# Patient Record
Sex: MALE | Race: BLACK OR AFRICAN AMERICAN | NOT HISPANIC OR LATINO | Employment: FULL TIME | URBAN - METROPOLITAN AREA
[De-identification: names, ages, dates, MRNs, and addresses within clinical notes are randomized per-mention and may not be internally consistent; named-entity substitution may affect disease eponyms.]

---

## 2018-06-28 ENCOUNTER — HOSPITAL ENCOUNTER (EMERGENCY)
Facility: HOSPITAL | Age: 33
Discharge: HOME | End: 2018-06-28
Attending: EMERGENCY MEDICINE
Payer: COMMERCIAL

## 2018-06-28 VITALS
WEIGHT: 220 LBS | TEMPERATURE: 99 F | OXYGEN SATURATION: 98 % | BODY MASS INDEX: 30.8 KG/M2 | HEART RATE: 110 BPM | SYSTOLIC BLOOD PRESSURE: 158 MMHG | DIASTOLIC BLOOD PRESSURE: 91 MMHG | HEIGHT: 71 IN | RESPIRATION RATE: 16 BRPM

## 2018-06-28 DIAGNOSIS — K08.89 PAIN, DENTAL: Primary | ICD-10-CM

## 2018-06-28 DIAGNOSIS — R68.84 PAIN IN UPPER JAW: ICD-10-CM

## 2018-06-28 PROCEDURE — 99283 EMERGENCY DEPT VISIT LOW MDM: CPT | Mod: ,,, | Performed by: EMERGENCY MEDICINE

## 2018-06-28 PROCEDURE — 99283 EMERGENCY DEPT VISIT LOW MDM: CPT

## 2018-06-28 RX ORDER — ACETAMINOPHEN AND CODEINE PHOSPHATE 300; 30 MG/1; MG/1
1 TABLET ORAL EVERY 6 HOURS PRN
Qty: 12 TABLET | Refills: 0 | Status: SHIPPED | OUTPATIENT
Start: 2018-06-28 | End: 2018-07-08

## 2018-06-28 RX ORDER — PENICILLIN V POTASSIUM 500 MG/1
500 TABLET, FILM COATED ORAL 4 TIMES DAILY
Qty: 40 TABLET | Refills: 0 | Status: SHIPPED | OUTPATIENT
Start: 2018-06-28 | End: 2018-07-05

## 2018-06-28 NOTE — ED PROVIDER NOTES
Encounter Date: 6/28/2018       History     Chief Complaint   Patient presents with    Dental Pain     3 days of progressive pain to right upper jaw. Had been scheduled to have a wisdome tooth extraction on July 7th. No fever, no chills, no difficulty swallowing or opening mouth.           Review of patient's allergies indicates:  No Known Allergies  History reviewed. No pertinent past medical history.  History reviewed. No pertinent surgical history.  History reviewed. No pertinent family history.  Social History   Substance Use Topics    Smoking status: Current Every Day Smoker    Smokeless tobacco: Never Used    Alcohol use Yes     Review of Systems   Constitutional: Negative.    HENT: Positive for dental problem and ear pain. Negative for sinus pain and sore throat.    Eyes: Negative.    Respiratory: Negative.    Cardiovascular: Negative.    Gastrointestinal: Negative.    Endocrine: Negative.    Genitourinary: Negative.    Musculoskeletal: Negative.    Neurological: Negative.    All other systems reviewed and are negative.      Physical Exam     Initial Vitals [06/28/18 1543]   BP Pulse Resp Temp SpO2   (!) 158/91 110 16 98.9 °F (37.2 °C) 98 %      MAP       --         Physical Exam    Nursing note and vitals reviewed.  Constitutional: He appears well-developed and well-nourished.   HENT:   Head: Normocephalic and atraumatic.   Mouth/Throat: Oropharynx is clear and moist.   Right upper wisdom tooth with fracture - missing outer posterior 1/4 of tooth.    Eyes: EOM are normal. Pupils are equal, round, and reactive to light.   Neck: Normal range of motion. No tracheal deviation present.   Cardiovascular: Normal rate, regular rhythm and normal heart sounds.   Pulmonary/Chest: Breath sounds normal.   Abdominal: Soft.   Musculoskeletal: Normal range of motion.   Lymphadenopathy:     He has no cervical adenopathy.   Neurological: He is alert and oriented to person, place, and time. He has normal strength.   Skin:  Skin is warm. Capillary refill takes less than 2 seconds.         ED Course   Procedures  Labs Reviewed - No data to display       Imaging Results    None          Medical Decision Making:   ED Management:  Counseled on tobacco use.                       Clinical Impression:   The primary encounter diagnosis was Pain, dental. A diagnosis of Pain in upper jaw was also pertinent to this visit.                             Miah Juares MD  06/28/18 [id number]

## 2018-11-05 ENCOUNTER — HOSPITAL ENCOUNTER (EMERGENCY)
Facility: HOSPITAL | Age: 33
Discharge: HOME | End: 2018-11-05
Attending: EMERGENCY MEDICINE
Payer: COMMERCIAL

## 2018-11-05 VITALS
WEIGHT: 200 LBS | RESPIRATION RATE: 18 BRPM | TEMPERATURE: 98 F | SYSTOLIC BLOOD PRESSURE: 138 MMHG | BODY MASS INDEX: 28 KG/M2 | HEART RATE: 85 BPM | OXYGEN SATURATION: 98 % | DIASTOLIC BLOOD PRESSURE: 78 MMHG | HEIGHT: 71 IN

## 2018-11-05 DIAGNOSIS — L84 PLANTAR CALLUS: Primary | ICD-10-CM

## 2018-11-05 PROCEDURE — 99283 EMERGENCY DEPT VISIT LOW MDM: CPT

## 2018-11-05 PROCEDURE — 99282 EMERGENCY DEPT VISIT SF MDM: CPT | Mod: ,,, | Performed by: PHYSICIAN ASSISTANT

## 2018-11-05 NOTE — ED NOTES
Patient with callous to left foot, which started hurting him last night. Denies DM.  Denies drainage or fevers.

## 2018-11-06 NOTE — ED PROVIDER NOTES
Encounter Date: 11/5/2018       History     Chief Complaint   Patient presents with    Foot Problem     32 y/o male presents to the ER with chief complaint of left foot pain since last night.  Patient reports 10/10 pain on the bottom of his foot this morning, but his pain has improved throughout the day.  He denies recent injury or fall.  His pain is worse with walking.  He denies redness of the skin, fever, chills, or additional complaints at this time.            Review of patient's allergies indicates:  No Known Allergies  History reviewed. No pertinent past medical history.  No past surgical history on file.  No family history on file.  Social History     Tobacco Use    Smoking status: Current Every Day Smoker     Packs/day: 1.50     Types: Cigarettes    Smokeless tobacco: Never Used   Substance Use Topics    Alcohol use: Yes    Drug use: Not on file     Review of Systems   Constitutional: Negative for chills and fever.   HENT: Negative for sore throat.    Respiratory: Negative for shortness of breath.    Cardiovascular: Negative for chest pain.   Gastrointestinal: Negative for nausea and vomiting.   Musculoskeletal: Negative for back pain.   Skin: Negative for color change and rash.       Physical Exam     Initial Vitals [11/05/18 1602]   BP Pulse Resp Temp SpO2   138/78 85 18 98.2 °F (36.8 °C) 98 %      MAP       --         Physical Exam    Nursing note and vitals reviewed.  Constitutional: He appears well-developed and well-nourished.   HENT:   Head: Atraumatic.   Eyes: Conjunctivae and EOM are normal. Pupils are equal, round, and reactive to light.   Neck: Normal range of motion. Neck supple.   Cardiovascular: Normal rate, regular rhythm and intact distal pulses.   Pulmonary/Chest: Breath sounds normal. No respiratory distress. He has no wheezes. He has no rhonchi. He has no rales.   Musculoskeletal:        Feet:    Neurological: He is alert and oriented to person, place, and time. He has normal  strength.   Skin: No rash noted.   Psychiatric: He has a normal mood and affect.         ED Course   Procedures  Labs Reviewed - No data to display       Imaging Results    None                APC / Resident Notes:   Patient presents to the ER for evaluation of left foot pain.  He has no history of trauma or bony tenderness on exam.  The patient has a plantar wart vs callus on the left foot.  I will advise treatment with over-the-counter salicylic acid.  He is also provided with podiatry clinic contact number if he does not improve with over-the-counter treatment.  Patient is advised to take Motrin and Tylenol for any discomfort.  He is given ER return precautions.  He is advised to follow up with a PCP within 1 week for ER follow-up exam.                 Clinical Impression:   The encounter diagnosis was Plantar callus.                             SISSY Rivera  11/05/18 2028

## 2018-11-20 ENCOUNTER — OFFICE VISIT (OUTPATIENT)
Dept: PODIATRY | Facility: CLINIC | Age: 33
End: 2018-11-20
Payer: COMMERCIAL

## 2018-11-20 VITALS
WEIGHT: 200 LBS | HEART RATE: 83 BPM | SYSTOLIC BLOOD PRESSURE: 146 MMHG | HEIGHT: 61 IN | BODY MASS INDEX: 37.76 KG/M2 | DIASTOLIC BLOOD PRESSURE: 81 MMHG

## 2018-11-20 DIAGNOSIS — L84 CORN OR CALLUS: Primary | ICD-10-CM

## 2018-11-20 DIAGNOSIS — M79.672 LEFT FOOT PAIN: ICD-10-CM

## 2018-11-20 PROCEDURE — 99203 OFFICE O/P NEW LOW 30 MIN: CPT | Mod: S$GLB,,, | Performed by: PODIATRIST

## 2018-11-20 PROCEDURE — 3008F BODY MASS INDEX DOCD: CPT | Mod: CPTII,S$GLB,, | Performed by: PODIATRIST

## 2018-11-20 PROCEDURE — 99999 PR PBB SHADOW E&M-EST. PATIENT-LVL III: CPT | Mod: PBBFAC,,, | Performed by: PODIATRIST

## 2018-11-27 NOTE — PROGRESS NOTES
Subjective:      Patient ID: Luca Ferraro is a 33 y.o. male.    Chief Complaint: Foot Pain (under the bottom)    Pt presents today c/o a painful callus under the bottom of his left foot. Pt states it is especially painful when he wear shoes and walks.     Review of Systems   Constitution: Negative for chills, fever and malaise/fatigue.   HENT: Negative for hearing loss.    Cardiovascular: Negative for claudication.   Respiratory: Negative for shortness of breath.    Skin: Positive for dry skin. Negative for flushing and rash.   Musculoskeletal: Negative for joint pain and myalgias.   Neurological: Negative for loss of balance, numbness, paresthesias and sensory change.   Psychiatric/Behavioral: Negative for altered mental status.           Objective:      Physical Exam   Constitutional: He is oriented to person, place, and time. He appears well-developed and well-nourished.   Cardiovascular:   Pulses:       Dorsalis pedis pulses are 2+ on the right side, and 2+ on the left side.        Posterior tibial pulses are 2+ on the right side, and 2+ on the left side.   no edema noted to b/L LEs   Musculoskeletal:        Right knee: He exhibits no swelling and no ecchymosis.        Left knee: He exhibits no swelling and no ecchymosis.        Right ankle: He exhibits normal range of motion, no swelling, no ecchymosis and normal pulse. No lateral malleolus, no medial malleolus and no head of 5th metatarsal tenderness found. Achilles tendon exhibits no pain, no defect and normal Barcenas's test results.        Left ankle: He exhibits normal range of motion, no swelling, no ecchymosis and normal pulse. No lateral malleolus, no medial malleolus and no head of 5th metatarsal tenderness found. Achilles tendon exhibits no pain and normal Barcenas's test results.        Right lower leg: He exhibits no tenderness, no bony tenderness, no swelling, no edema and no deformity.        Left lower leg: He exhibits no tenderness, no  swelling and no edema.        Right foot: There is normal range of motion and no deformity.        Left foot: There is normal range of motion and no deformity.   Adequate joint ROM noted to all lower extremity muscle groups with no pain or crepitation noted. Muscle strength is 5/5 in all groups bilaterally.     Feet:   Right Foot:   Protective Sensation: 5 sites tested. 5 sites sensed.   Left Foot:   Protective Sensation: 5 sites tested. 5 sites sensed.   Neurological: He is alert and oriented to person, place, and time.   Gross sensation intact to b/L lower extremities   Skin: Skin is warm. Capillary refill takes more than 3 seconds. No abrasion, no bruising, no burn and no ecchymosis noted.   No open lesions noted to b/L lower extremities.   HKL's noted to sub 5th mets b/L   Psychiatric: He has a normal mood and affect. His speech is normal and behavior is normal. He is attentive.             Assessment:       Encounter Diagnoses   Name Primary?    Corn or callus Yes    Left foot pain          Plan:       Luca was seen today for foot pain.    Diagnoses and all orders for this visit:    Corn or callus    Left foot pain      I counseled the patient on his conditions, their implications and medical management.      Pt advised that he does not qualify for foot care. Pt advised on proper nail care, using a pumice stone for callused areas, moisturizing the feet, and overall foot care.   Pt advised that he can RTC as a Proc-b pt for foot care. Calluses debrided as a courtesy.   Call or return to clinic prn if these symptoms worsen or fail to improve as anticipated.      .

## 2018-12-26 ENCOUNTER — HOSPITAL ENCOUNTER (EMERGENCY)
Facility: HOSPITAL | Age: 33
Discharge: HOME | End: 2018-12-26
Attending: EMERGENCY MEDICINE
Payer: COMMERCIAL

## 2018-12-26 VITALS
RESPIRATION RATE: 16 BRPM | DIASTOLIC BLOOD PRESSURE: 91 MMHG | HEART RATE: 80 BPM | WEIGHT: 220 LBS | HEIGHT: 71 IN | SYSTOLIC BLOOD PRESSURE: 150 MMHG | BODY MASS INDEX: 30.8 KG/M2 | TEMPERATURE: 99 F | OXYGEN SATURATION: 99 %

## 2018-12-26 DIAGNOSIS — Z71.1 FEARED COMPLAINT WITHOUT DIAGNOSIS: Primary | ICD-10-CM

## 2018-12-26 PROCEDURE — 99282 EMERGENCY DEPT VISIT SF MDM: CPT | Mod: ,,, | Performed by: NURSE PRACTITIONER

## 2018-12-26 PROCEDURE — 99281 EMR DPT VST MAYX REQ PHY/QHP: CPT

## 2018-12-26 PROCEDURE — 99282 PR EMERGENCY DEPT VISIT,LEVEL II: ICD-10-PCS | Mod: ,,, | Performed by: NURSE PRACTITIONER

## 2018-12-26 NOTE — ED PROVIDER NOTES
Encounter Date: 12/26/2018       History     Chief Complaint   Patient presents with    Letter for School/Work     Headache last night.      Pt is a 34 yo male with no signifigant medical history presenting to the ED for a work note.  Pt states he left work last night due to a headache.  Pt states boss is requiring a note to go back to work today.  Pt denies any complaints on exam.  Pt states headache was resolved by sleeping.  Pt denies any dizziness, headache or blurred vision.  Pt denies any falls or trauma.          Review of patient's allergies indicates:  No Known Allergies  No past medical history on file.  No past surgical history on file.  No family history on file.  Social History     Tobacco Use    Smoking status: Current Every Day Smoker     Packs/day: 1.50     Types: Cigarettes    Smokeless tobacco: Never Used   Substance Use Topics    Alcohol use: Yes    Drug use: Not on file     Review of Systems   Constitutional: Negative for activity change, appetite change, chills, fatigue and fever.   HENT: Negative for sore throat.    Respiratory: Negative for cough, chest tightness, shortness of breath and wheezing.    Cardiovascular: Negative for chest pain and palpitations.   Gastrointestinal: Negative for abdominal pain, constipation, diarrhea, nausea and vomiting.   Genitourinary: Negative for decreased urine volume, difficulty urinating, dysuria and urgency.   Musculoskeletal: Negative for arthralgias, back pain, myalgias and neck pain.   Skin: Negative for rash and wound.   Neurological: Negative for dizziness, syncope, weakness, numbness and headaches.   All other systems reviewed and are negative.      Physical Exam     Initial Vitals [12/26/18 1411]   BP Pulse Resp Temp SpO2   (!) 150/91 80 16 98.8 °F (37.1 °C) 99 %      MAP       --         Physical Exam    Nursing note and vitals reviewed.  Constitutional: Vital signs are normal. He appears well-developed and well-nourished.   HENT:   Head:  Normocephalic and atraumatic.   Cardiovascular: Normal rate and regular rhythm.   Pulmonary/Chest: Effort normal.   Abdominal: Normal appearance.   Musculoskeletal: Normal range of motion.   Neurological: He is alert and oriented to person, place, and time. He has normal strength. No sensory deficit. GCS eye subscore is 4. GCS verbal subscore is 5. GCS motor subscore is 6.   Skin: Skin is warm, dry and intact. Capillary refill takes less than 2 seconds. No abrasion, no laceration and no rash noted. No cyanosis. Nails show no clubbing.         ED Course   Procedures  Labs Reviewed - No data to display       Imaging Results    None                APC / Resident Notes:   Emergent evaluation of a 32 yo male patient presenting to the ER due to needing a worknote.  Pt states that he left work early last night due to headache. Patient states he needs a note stating he is able to go back to work today.  On exam patient denies any complaints. Pupils equal round reactive 3-2 mm.  Abdomen soft and nontender. Breath sounds clear bilaterally. No tachycardia noted. Strength 5/5 in all extremities.  Patient able to ambulate with no assistance.  I do not feel labs or imaging are pertinent for the care of this patient.  I discussed the care of this patient with my Supervising Physician.      Patient is hemodynamically stable, vital signs are normal. Discharge instructions given. Return to ED precautions discussed. Follow up as directed.  Work note given to return to work today.  Pt verbalized understanding of this plan. Pt is stable for discharge.                    Clinical Impression:   The encounter diagnosis was Feared complaint without diagnosis.      Disposition:   Disposition: Discharged  Condition: Stable                        Geri Barrios NP  12/26/18 2028

## 2018-12-26 NOTE — ED NOTES
Patient identifiers verified and correct for Luca Ronan.    LOC: The patient is awake, alert and aware of environment with an appropriate affect, the patient is oriented x 3 and speaking appropriately.  APPEARANCE: Patient resting comfortably and in no acute distress, patient is clean and well groomed, patient's clothing is properly fastened.  SKIN: The skin is warm and dry, color consistent with ethnicity, patient has normal skin turgor and moist mucus membranes, skin intact, no breakdown or bruising noted.  MUSCULOSKELETAL: Patient moving all extremities spontaneously, no obvious swelling or deformities noted.  RESPIRATORY: Airway is open and patent, respirations are spontaneous, patient has a normal effort and rate, no accessory muscle use noted, bilateral breath sounds clear and present.   CARDIAC: Patient has a normal rate and regular rhythm, no periphreal edema noted, capillary refill < 3 seconds.  ABDOMEN: Soft and non tender to palpation, no distention noted, normoactive bowel sounds present in all four quadrants.  NEUROLOGIC: PERRL, 3 mm bilaterally, eyes open spontaneously, behavior appropriate to situation, follows commands, facial expression symmetrical, bilateral hand grasp equal and even, purposeful motor response noted, normal sensation in all extremities when touched with a finger.

## 2020-02-18 NOTE — ED NOTES
Addended by: LAWRENCE WATTS on: 2/18/2020 09:44 AM     Modules accepted: Orders     Pt identifiers checked and accurate with Luca Ferraro     Pt reports to ED with complaints of dental pain x 3 days. Pt reports dental appointment made, pain too sever to wait. Pt denies trauma, N/V/D, fever and chills.     LOC: The patient is awake, alert and aware of environment with an appropriate affect, the patient is oriented x 3 and speaking appropriately.  APPEARANCE: Patient resting comfortably and in no acute distress, patient is clean and well groomed  SKIN: The skin is warm and dry, color consistent with ethnicity, patient has normal skin turgor and moist mucus membranes, skin intact, no breakdown or bruising noted.  MUSCULOSKELETAL: Patient moving all extremities well, no obvious swelling or deformities noted. Pt ambulates unassisted, steady gait.   RESPIRATORY: Airway is open and patent; respirations are spontaneous, patient has a normal effort and rate, no accessory muscle use noted.   NEUROLOGIC: PERRL, 3 mm bilaterally, eyes open spontaneously, behavior appropriate to situation, follows commands, facial expression symmetrical. Pt denies headache, vision changes

## 2020-11-16 ENCOUNTER — HOSPITAL ENCOUNTER (INPATIENT)
Facility: HOSPITAL | Age: 35
LOS: 6 days | Discharge: HOME | DRG: 854 | End: 2020-11-22
Attending: EMERGENCY MEDICINE | Admitting: HOSPITALIST
Payer: MEDICAID

## 2020-11-16 DIAGNOSIS — L03.116 CELLULITIS AND ABSCESS OF LEFT LEG: Primary | ICD-10-CM

## 2020-11-16 DIAGNOSIS — Z72.0 TOBACCO ABUSE: ICD-10-CM

## 2020-11-16 DIAGNOSIS — M79.662 PAIN IN LEFT LOWER LEG: ICD-10-CM

## 2020-11-16 DIAGNOSIS — R07.9 CHEST PAIN: ICD-10-CM

## 2020-11-16 DIAGNOSIS — A41.9 SEPSIS, DUE TO UNSPECIFIED ORGANISM, UNSPECIFIED WHETHER ACUTE ORGAN DYSFUNCTION PRESENT: ICD-10-CM

## 2020-11-16 DIAGNOSIS — L03.116 CELLULITIS OF LEFT LOWER EXTREMITY: ICD-10-CM

## 2020-11-16 DIAGNOSIS — L02.416 CELLULITIS AND ABSCESS OF LEFT LEG: Primary | ICD-10-CM

## 2020-11-16 DIAGNOSIS — I47.20 V-TACH: ICD-10-CM

## 2020-11-16 LAB
ALBUMIN SERPL BCP-MCNC: 3.9 G/DL (ref 3.5–5.2)
ALP SERPL-CCNC: 76 U/L (ref 55–135)
ALT SERPL W/O P-5'-P-CCNC: 16 U/L (ref 10–44)
ANION GAP SERPL CALC-SCNC: 14 MMOL/L (ref 8–16)
AST SERPL-CCNC: 21 U/L (ref 10–40)
BASOPHILS # BLD AUTO: 0.03 K/UL (ref 0–0.2)
BASOPHILS NFR BLD: 0.2 % (ref 0–1.9)
BILIRUB SERPL-MCNC: 0.5 MG/DL (ref 0.1–1)
BUN SERPL-MCNC: 5 MG/DL (ref 6–20)
CALCIUM SERPL-MCNC: 9.4 MG/DL (ref 8.7–10.5)
CHLORIDE SERPL-SCNC: 100 MMOL/L (ref 95–110)
CK SERPL-CCNC: 226 U/L (ref 20–200)
CO2 SERPL-SCNC: 24 MMOL/L (ref 23–29)
CREAT SERPL-MCNC: 1.1 MG/DL (ref 0.5–1.4)
CRP SERPL-MCNC: 71.6 MG/L (ref 0–8.2)
CTP QC/QA: YES
DIFFERENTIAL METHOD: ABNORMAL
EOSINOPHIL # BLD AUTO: 0 K/UL (ref 0–0.5)
EOSINOPHIL NFR BLD: 0.2 % (ref 0–8)
ERYTHROCYTE [DISTWIDTH] IN BLOOD BY AUTOMATED COUNT: 12.5 % (ref 11.5–14.5)
ERYTHROCYTE [SEDIMENTATION RATE] IN BLOOD BY WESTERGREN METHOD: 30 MM/HR (ref 0–10)
EST. GFR  (AFRICAN AMERICAN): >60 ML/MIN/1.73 M^2
EST. GFR  (NON AFRICAN AMERICAN): >60 ML/MIN/1.73 M^2
GLUCOSE SERPL-MCNC: 111 MG/DL (ref 70–110)
HCT VFR BLD AUTO: 46 % (ref 40–54)
HGB BLD-MCNC: 15.6 G/DL (ref 14–18)
IMM GRANULOCYTES # BLD AUTO: 0.05 K/UL (ref 0–0.04)
IMM GRANULOCYTES NFR BLD AUTO: 0.4 % (ref 0–0.5)
LACTATE SERPL-SCNC: 0.9 MMOL/L (ref 0.5–2.2)
LYMPHOCYTES # BLD AUTO: 1.2 K/UL (ref 1–4.8)
LYMPHOCYTES NFR BLD: 9.9 % (ref 18–48)
MCH RBC QN AUTO: 28.9 PG (ref 27–31)
MCHC RBC AUTO-ENTMCNC: 33.9 G/DL (ref 32–36)
MCV RBC AUTO: 85 FL (ref 82–98)
MONOCYTES # BLD AUTO: 1.4 K/UL (ref 0.3–1)
MONOCYTES NFR BLD: 11.3 % (ref 4–15)
NEUTROPHILS # BLD AUTO: 9.4 K/UL (ref 1.8–7.7)
NEUTROPHILS NFR BLD: 78 % (ref 38–73)
NRBC BLD-RTO: 0 /100 WBC
PLATELET # BLD AUTO: 272 K/UL (ref 150–350)
PMV BLD AUTO: 9.6 FL (ref 9.2–12.9)
POTASSIUM SERPL-SCNC: 3.5 MMOL/L (ref 3.5–5.1)
PROT SERPL-MCNC: 8.1 G/DL (ref 6–8.4)
RBC # BLD AUTO: 5.39 M/UL (ref 4.6–6.2)
SARS-COV-2 RDRP RESP QL NAA+PROBE: NEGATIVE
SODIUM SERPL-SCNC: 138 MMOL/L (ref 136–145)
WBC # BLD AUTO: 12.03 K/UL (ref 3.9–12.7)

## 2020-11-16 PROCEDURE — 25000003 PHARM REV CODE 250: Performed by: PHYSICIAN ASSISTANT

## 2020-11-16 PROCEDURE — 85025 COMPLETE CBC W/AUTO DIFF WBC: CPT

## 2020-11-16 PROCEDURE — 96375 TX/PRO/DX INJ NEW DRUG ADDON: CPT

## 2020-11-16 PROCEDURE — 25500020 PHARM REV CODE 255: Performed by: EMERGENCY MEDICINE

## 2020-11-16 PROCEDURE — 11000001 HC ACUTE MED/SURG PRIVATE ROOM

## 2020-11-16 PROCEDURE — 87040 BLOOD CULTURE FOR BACTERIA: CPT | Mod: 59

## 2020-11-16 PROCEDURE — U0002 COVID-19 LAB TEST NON-CDC: HCPCS | Performed by: PHYSICIAN ASSISTANT

## 2020-11-16 PROCEDURE — G0378 HOSPITAL OBSERVATION PER HR: HCPCS

## 2020-11-16 PROCEDURE — 99285 EMERGENCY DEPT VISIT HI MDM: CPT | Mod: 25

## 2020-11-16 PROCEDURE — 96361 HYDRATE IV INFUSION ADD-ON: CPT

## 2020-11-16 PROCEDURE — 83605 ASSAY OF LACTIC ACID: CPT

## 2020-11-16 PROCEDURE — 80053 COMPREHEN METABOLIC PANEL: CPT

## 2020-11-16 PROCEDURE — 82550 ASSAY OF CK (CPK): CPT

## 2020-11-16 PROCEDURE — 86703 HIV-1/HIV-2 1 RESULT ANTBDY: CPT

## 2020-11-16 PROCEDURE — 86140 C-REACTIVE PROTEIN: CPT

## 2020-11-16 PROCEDURE — 63600175 PHARM REV CODE 636 W HCPCS: Performed by: PHYSICIAN ASSISTANT

## 2020-11-16 PROCEDURE — 36415 COLL VENOUS BLD VENIPUNCTURE: CPT

## 2020-11-16 PROCEDURE — 96374 THER/PROPH/DIAG INJ IV PUSH: CPT

## 2020-11-16 PROCEDURE — 85652 RBC SED RATE AUTOMATED: CPT

## 2020-11-16 RX ORDER — GLUCAGON 1 MG
1 KIT INJECTION
Status: DISCONTINUED | OUTPATIENT
Start: 2020-11-16 | End: 2020-11-22 | Stop reason: HOSPADM

## 2020-11-16 RX ORDER — HYDROCODONE BITARTRATE AND ACETAMINOPHEN 5; 325 MG/1; MG/1
1 TABLET ORAL EVERY 6 HOURS PRN
Status: DISCONTINUED | OUTPATIENT
Start: 2020-11-16 | End: 2020-11-22 | Stop reason: HOSPADM

## 2020-11-16 RX ORDER — SODIUM CHLORIDE 0.9 % (FLUSH) 0.9 %
10 SYRINGE (ML) INJECTION
Status: DISCONTINUED | OUTPATIENT
Start: 2020-11-16 | End: 2020-11-22 | Stop reason: HOSPADM

## 2020-11-16 RX ORDER — IBUPROFEN 200 MG
24 TABLET ORAL
Status: DISCONTINUED | OUTPATIENT
Start: 2020-11-16 | End: 2020-11-22 | Stop reason: HOSPADM

## 2020-11-16 RX ORDER — SODIUM CHLORIDE 9 MG/ML
INJECTION, SOLUTION INTRAVENOUS CONTINUOUS
Status: DISCONTINUED | OUTPATIENT
Start: 2020-11-16 | End: 2020-11-22

## 2020-11-16 RX ORDER — IBUPROFEN 200 MG
16 TABLET ORAL
Status: DISCONTINUED | OUTPATIENT
Start: 2020-11-16 | End: 2020-11-22 | Stop reason: HOSPADM

## 2020-11-16 RX ORDER — AMOXICILLIN 250 MG
1 CAPSULE ORAL DAILY PRN
Status: DISCONTINUED | OUTPATIENT
Start: 2020-11-16 | End: 2020-11-22 | Stop reason: HOSPADM

## 2020-11-16 RX ORDER — OXYCODONE HCL 10 MG/1
10 TABLET, FILM COATED, EXTENDED RELEASE ORAL EVERY 12 HOURS PRN
Status: ON HOLD | COMMUNITY
End: 2020-11-22 | Stop reason: HOSPADM

## 2020-11-16 RX ORDER — TALC
6 POWDER (GRAM) TOPICAL NIGHTLY PRN
Status: DISCONTINUED | OUTPATIENT
Start: 2020-11-16 | End: 2020-11-22 | Stop reason: HOSPADM

## 2020-11-16 RX ORDER — ACETAMINOPHEN 500 MG
500 TABLET ORAL EVERY 6 HOURS PRN
Status: DISCONTINUED | OUTPATIENT
Start: 2020-11-16 | End: 2020-11-18

## 2020-11-16 RX ORDER — SODIUM CHLORIDE 0.9 % (FLUSH) 0.9 %
10 SYRINGE (ML) INJECTION
Status: DISCONTINUED | OUTPATIENT
Start: 2020-11-16 | End: 2020-11-17

## 2020-11-16 RX ORDER — ACETAMINOPHEN 500 MG
1000 TABLET ORAL
Status: COMPLETED | OUTPATIENT
Start: 2020-11-16 | End: 2020-11-16

## 2020-11-16 RX ADMIN — VANCOMYCIN HYDROCHLORIDE 2000 MG: 100 INJECTION, POWDER, LYOPHILIZED, FOR SOLUTION INTRAVENOUS at 04:11

## 2020-11-16 RX ADMIN — HYDROCODONE BITARTRATE AND ACETAMINOPHEN 1 TABLET: 5; 325 TABLET ORAL at 09:11

## 2020-11-16 RX ADMIN — IOHEXOL 100 ML: 350 INJECTION, SOLUTION INTRAVENOUS at 06:11

## 2020-11-16 RX ADMIN — SODIUM CHLORIDE: 0.9 INJECTION, SOLUTION INTRAVENOUS at 10:11

## 2020-11-16 RX ADMIN — SODIUM CHLORIDE 1000 ML: 0.9 INJECTION, SOLUTION INTRAVENOUS at 04:11

## 2020-11-16 RX ADMIN — ACETAMINOPHEN 1000 MG: 500 TABLET ORAL at 08:11

## 2020-11-16 RX ADMIN — CEFTRIAXONE SODIUM 1 G: 1 INJECTION, POWDER, FOR SOLUTION INTRAMUSCULAR; INTRAVENOUS at 07:11

## 2020-11-16 NOTE — ED TRIAGE NOTES
"Pt arrives to the ED reports left leg pain, "I think I got bit by something the other day." States the pt. Visible swelling and drainage noted on pt's left leg, precisely, on his calf.  "

## 2020-11-16 NOTE — ED PROVIDER NOTES
Encounter Date: 11/16/2020    SCRIBE #1 NOTE: I, Ryan Murray, am scribing for, and in the presence of,  Brayan Huerta PA-C. I have scribed the following portions of the note - Other sections scribed: HPI, ROS.       History     Chief Complaint   Patient presents with    Leg Pain     left lower leg pain w/ swelling.   states was bitten by something x 2 days ago.  swelling from knee down + warmth and drainage lateral calf area.     CC: Leg Pain    HPI: This is a 34 y/o male with no known PMHx presenting to the ED c/o left lower leg pain, swelling, and feeling feverish s/p insect bite 2 days ago.  Pt reports he was working outside on his car 2 days ago when he felt that something had bit him.  He is unsure what bit him.  He notes drainage from the site which prompted him to come to the ED.  He rates his current pain severity 10/10.  He denies any chills or generalized myalgias.  NKDA.  He is not on daily meds.  He does not have a PCP.    The history is provided by the patient. No  was used.     Review of patient's allergies indicates:  No Known Allergies  History reviewed. No pertinent past medical history.  History reviewed. No pertinent surgical history.  History reviewed. No pertinent family history.  Social History     Tobacco Use    Smoking status: Current Every Day Smoker     Packs/day: 1.00     Types: Cigarettes    Smokeless tobacco: Never Used   Substance Use Topics    Alcohol use: Yes    Drug use: Not Currently     Types: Marijuana     Review of Systems   Constitutional: Positive for fever (subjective). Negative for chills.   HENT: Negative for sore throat.    Eyes: Negative for visual disturbance.   Respiratory: Negative for shortness of breath.    Cardiovascular: Negative for chest pain.   Gastrointestinal: Negative for abdominal pain.   Genitourinary: Negative for dysuria.   Musculoskeletal: Negative for back pain.        (+) Left leg pain and swelling.   Skin: Positive for  wound. Negative for rash.   Neurological: Negative for headaches.       Physical Exam     Initial Vitals [11/16/20 1523]   BP Pulse Resp Temp SpO2   135/85 (!) 130 18 99.4 °F (37.4 °C) 100 %      MAP       --         Physical Exam    Nursing note and vitals reviewed.  Constitutional: He appears well-developed and well-nourished. No distress.   HENT:   Head: Normocephalic and atraumatic.   Nose: Nose normal.   Mouth/Throat: Oropharynx is clear and moist.   Eyes: Conjunctivae and EOM are normal. Pupils are equal, round, and reactive to light.   Neck: Normal range of motion. Neck supple.   Cardiovascular: Normal rate, regular rhythm, normal heart sounds and intact distal pulses.   Pulmonary/Chest: Breath sounds normal. No respiratory distress.   Abdominal: Soft. He exhibits no distension. There is no abdominal tenderness.   Musculoskeletal: Normal range of motion. No tenderness or edema.   Neurological: He is alert and oriented to person, place, and time. He has normal strength. No sensory deficit.   Skin: Skin is warm and dry. Capillary refill takes less than 2 seconds. Abscess noted. There is erythema.   Draining wound with extensive surrounding erythema, induration, warmth to posterior calf.  2+ dorsalis pedis pulse present.  Distal sensation intact.   Psychiatric: He has a normal mood and affect.         ED Course   Procedures  Labs Reviewed   CBC W/ AUTO DIFFERENTIAL - Abnormal; Notable for the following components:       Result Value    Gran # (ANC) 9.4 (*)     Immature Grans (Abs) 0.05 (*)     Mono # 1.4 (*)     Gran % 78.0 (*)     Lymph % 9.9 (*)     All other components within normal limits   COMPREHENSIVE METABOLIC PANEL - Abnormal; Notable for the following components:    Glucose 111 (*)     BUN 5 (*)     All other components within normal limits   SEDIMENTATION RATE - Abnormal; Notable for the following components:    Sed Rate 30 (*)     All other components within normal limits   C-REACTIVE PROTEIN -  Abnormal; Notable for the following components:    CRP 71.6 (*)     All other components within normal limits   CULTURE, BLOOD   CULTURE, BLOOD   SARS-COV-2 RDRP GENE          Imaging Results          CT Leg (Tibia-Fibula) Wtih Contrast Left (Final result)  Result time 11/16/20 19:05:54    Final result by Sharmaine Ramos MD (11/16/20 19:05:54)                 Impression:      Extensive cellulitis, as above described.  Small fluid seen particularly overlying the thickened superficial fascia of the posterior lower leg.  No bony abnormality detected.      Electronically signed by: Sharmaine Ramos  Date:    11/16/2020  Time:    19:05             Narrative:    EXAMINATION:  CT LEG WITH CONTRAST LEFT    CLINICAL HISTORY:  Lower leg swelling/redness, cellulitis suspected;    TECHNIQUE:  1.25 mm unenhanced axial images were obtained through the left lower leg.  Coronal and sagittal reformatted images were provided.    COMPARISON:  None.    FINDINGS:  Extensive reticular pattern in diffuse thickening of the subcutaneous tissue are seen particularly at the lateral and posterior aspect of the lower extremity.  There is thickening of the underlying superficial fascia in the posterior lower leg.  There is a crescent of fluid seen at the posterior aspect of the middle 3rd of the leg overlying the posterior musculature.  There is a tiny suprapatellar effusion.  There is no acute fracture or dislocation.  There is no bony destruction.                                 Medical Decision Making:   Clinical Tests:   Lab Tests: Ordered and Reviewed  ED Management:  Tachycardic with temp of 100.3 in the ED. Will obtain labs, blood cultures, CT of left lower extremity.  Will give IV vanc.  No leukocytosis.  CRP and sed rate elevated.  Normal kidney function.  CT read reports extensive cellulitis with small fluid seen particularly overlying the thickened superficial fascia of the posterior lower leg.  Discussed patient with Carlyle  MOLLY bueno and patient will be placed in observation for continued IV antibiotics.  Will add on Rocephin as well as vanc.  COVID negative.  Results and plan discussed and explained to patient who verbalizes understanding and is agreeable with staying for observation.            Scribe Attestation:   Scribe #1: I performed the above scribed service and the documentation accurately describes the services I performed. I attest to the accuracy of the note.                      Clinical Impression:     ICD-10-CM ICD-9-CM   1. Cellulitis of left lower extremity  L03.116 682.6   2. Chest pain  R07.9 786.50                    I, Brayan Huerta, personally performed the services described in this documentation. All medical record entries made by the scribe were at my direction and in my presence.  I have reviewed the chart and agree that the record reflects my personal performance and is accurate and complete.  Disposition:   Disposition: Discharged  Condition: Stable     ED Disposition Condition    Observation                             Brayan Huerta PA-C  11/16/20 1942       Brayan Huerta PA-C  11/16/20 2009

## 2020-11-17 LAB
ALBUMIN SERPL BCP-MCNC: 2.9 G/DL (ref 3.5–5.2)
ALP SERPL-CCNC: 54 U/L (ref 55–135)
ALT SERPL W/O P-5'-P-CCNC: 10 U/L (ref 10–44)
ANION GAP SERPL CALC-SCNC: 9 MMOL/L (ref 8–16)
AST SERPL-CCNC: 12 U/L (ref 10–40)
BASOPHILS # BLD AUTO: 0.04 K/UL (ref 0–0.2)
BASOPHILS NFR BLD: 0.4 % (ref 0–1.9)
BILIRUB SERPL-MCNC: 0.6 MG/DL (ref 0.1–1)
BILIRUB UR QL STRIP: NEGATIVE
BUN SERPL-MCNC: 5 MG/DL (ref 6–20)
CALCIUM SERPL-MCNC: 8.3 MG/DL (ref 8.7–10.5)
CHLORIDE SERPL-SCNC: 103 MMOL/L (ref 95–110)
CLARITY UR: CLEAR
CO2 SERPL-SCNC: 25 MMOL/L (ref 23–29)
COLOR UR: YELLOW
CREAT SERPL-MCNC: 0.9 MG/DL (ref 0.5–1.4)
DIFFERENTIAL METHOD: ABNORMAL
EOSINOPHIL # BLD AUTO: 0.1 K/UL (ref 0–0.5)
EOSINOPHIL NFR BLD: 0.8 % (ref 0–8)
ERYTHROCYTE [DISTWIDTH] IN BLOOD BY AUTOMATED COUNT: 12.7 % (ref 11.5–14.5)
EST. GFR  (AFRICAN AMERICAN): >60 ML/MIN/1.73 M^2
EST. GFR  (NON AFRICAN AMERICAN): >60 ML/MIN/1.73 M^2
GLUCOSE SERPL-MCNC: 93 MG/DL (ref 70–110)
GLUCOSE UR QL STRIP: NEGATIVE
HCT VFR BLD AUTO: 40 % (ref 40–54)
HGB BLD-MCNC: 13.1 G/DL (ref 14–18)
HGB UR QL STRIP: NEGATIVE
HIV1+2 IGG SERPL QL IA.RAPID: NORMAL
IMM GRANULOCYTES # BLD AUTO: 0.04 K/UL (ref 0–0.04)
IMM GRANULOCYTES NFR BLD AUTO: 0.4 % (ref 0–0.5)
KETONES UR QL STRIP: NEGATIVE
LEUKOCYTE ESTERASE UR QL STRIP: NEGATIVE
LYMPHOCYTES # BLD AUTO: 1.4 K/UL (ref 1–4.8)
LYMPHOCYTES NFR BLD: 15.4 % (ref 18–48)
MCH RBC QN AUTO: 28.6 PG (ref 27–31)
MCHC RBC AUTO-ENTMCNC: 32.8 G/DL (ref 32–36)
MCV RBC AUTO: 87 FL (ref 82–98)
MONOCYTES # BLD AUTO: 1.7 K/UL (ref 0.3–1)
MONOCYTES NFR BLD: 19 % (ref 4–15)
NEUTROPHILS # BLD AUTO: 5.8 K/UL (ref 1.8–7.7)
NEUTROPHILS NFR BLD: 64 % (ref 38–73)
NITRITE UR QL STRIP: NEGATIVE
NRBC BLD-RTO: 0 /100 WBC
PH UR STRIP: 7 [PH] (ref 5–8)
PLATELET # BLD AUTO: 220 K/UL (ref 150–350)
PMV BLD AUTO: 10 FL (ref 9.2–12.9)
POTASSIUM SERPL-SCNC: 3.6 MMOL/L (ref 3.5–5.1)
PROT SERPL-MCNC: 6.2 G/DL (ref 6–8.4)
PROT UR QL STRIP: NEGATIVE
RBC # BLD AUTO: 4.58 M/UL (ref 4.6–6.2)
SODIUM SERPL-SCNC: 137 MMOL/L (ref 136–145)
SP GR UR STRIP: 1 (ref 1–1.03)
URN SPEC COLLECT METH UR: NORMAL
UROBILINOGEN UR STRIP-ACNC: NEGATIVE EU/DL
WBC # BLD AUTO: 9.11 K/UL (ref 3.9–12.7)

## 2020-11-17 PROCEDURE — 96361 HYDRATE IV INFUSION ADD-ON: CPT

## 2020-11-17 PROCEDURE — 25000003 PHARM REV CODE 250: Performed by: EMERGENCY MEDICINE

## 2020-11-17 PROCEDURE — 63600175 PHARM REV CODE 636 W HCPCS: Performed by: EMERGENCY MEDICINE

## 2020-11-17 PROCEDURE — 63600175 PHARM REV CODE 636 W HCPCS: Performed by: PHYSICIAN ASSISTANT

## 2020-11-17 PROCEDURE — 36415 COLL VENOUS BLD VENIPUNCTURE: CPT

## 2020-11-17 PROCEDURE — 96376 TX/PRO/DX INJ SAME DRUG ADON: CPT

## 2020-11-17 PROCEDURE — 94761 N-INVAS EAR/PLS OXIMETRY MLT: CPT

## 2020-11-17 PROCEDURE — 80053 COMPREHEN METABOLIC PANEL: CPT

## 2020-11-17 PROCEDURE — G0378 HOSPITAL OBSERVATION PER HR: HCPCS

## 2020-11-17 PROCEDURE — 85025 COMPLETE CBC W/AUTO DIFF WBC: CPT

## 2020-11-17 PROCEDURE — 25000003 PHARM REV CODE 250: Performed by: PHYSICIAN ASSISTANT

## 2020-11-17 PROCEDURE — 81003 URINALYSIS AUTO W/O SCOPE: CPT

## 2020-11-17 PROCEDURE — 11000001 HC ACUTE MED/SURG PRIVATE ROOM

## 2020-11-17 RX ADMIN — VANCOMYCIN HYDROCHLORIDE 2000 MG: 10 INJECTION, POWDER, LYOPHILIZED, FOR SOLUTION INTRAVENOUS at 04:11

## 2020-11-17 RX ADMIN — SODIUM CHLORIDE: 0.9 INJECTION, SOLUTION INTRAVENOUS at 03:11

## 2020-11-17 RX ADMIN — CEFTRIAXONE 2 G: 2 INJECTION, SOLUTION INTRAVENOUS at 08:11

## 2020-11-17 RX ADMIN — VANCOMYCIN HYDROCHLORIDE 2000 MG: 10 INJECTION, POWDER, LYOPHILIZED, FOR SOLUTION INTRAVENOUS at 03:11

## 2020-11-17 RX ADMIN — HYDROCODONE BITARTRATE AND ACETAMINOPHEN 1 TABLET: 5; 325 TABLET ORAL at 03:11

## 2020-11-17 NOTE — HPI
Luca Ferraro 35 y.o. with tobacco abuse presents to the hospital with a chief complaint of lower extremity swelling. He reports since Friday he has had increasing lower extremity left swelling associated with an intermittent throbbing pain worsened with ambulation without alleviating factors. He has attempted no treatment at home. He has had increasing difficulty ambulating. He believes he was bitten by something that caused the initial swelling. He has been able to express pus and blood from the wound. He has had intermittent fevers at home. He denies fever, chest pain, SOB, N/V, abdominal pain, dizziness, dysuria, syncope. He smokes 1/2ppd he does not drink or use recreational drugs.     In the ED, CT with extensive cellulitis with small fluid seen overlying the thickened superifical fascia of the posterior leg, febrile to 101.8F and WBC of 12.03.

## 2020-11-17 NOTE — PLAN OF CARE
11/17/20 1059   Discharge Assessment   Assessment Type Discharge Planning Assessment   Assessment information obtained from? Medical Record   Prior to hospitilization cognitive status: Alert/Oriented   Prior to hospitalization functional status: Independent   Current cognitive status: Alert/Oriented   Current Functional Status: Independent   Facility Arrived From: home   Lives With other (see comments)   Able to Return to Prior Arrangements yes   Is patient able to care for self after discharge? Yes   Who are your caregiver(s) and their phone number(s)? [facility] [phone]   Patient's perception of discharge disposition home or selfcare   Readmission Within the Last 30 Days no previous admission in last 30 days   Patient currently being followed by outpatient case management? No   Patient currently receives any other outside agency services? No   Equipment Currently Used at Home none   Do you have any problems affording any of your prescribed medications? No   Is the patient taking medications as prescribed? yes   Does the patient have transportation home? Yes   Transportation Anticipated family or friend will provide   Does the patient receive services at the [facility]? No   Discharge Plan A Home  (with [facility] information)   DME Needed Upon Discharge  none   Patient/Family in Agreement with Plan yes     [id number] - [place], [place] - [address] AT [facility] [address]  [address]  [place] [address]  Phone: [phone] Fax: [phone]

## 2020-11-17 NOTE — H&P
[facility] Medicine  History & Physical    Patient Name: Luca Ferraro  MRN: [id number]  Admission Date: 11/16/2020  Attending Physician: Cas Martinez MD   Primary Care Provider: Primary Doctor No         Patient information was obtained from patient, past medical records and ER records.     Subjective:     Principal Problem:Cellulitis of left lower extremity    Chief Complaint:   Chief Complaint   Patient presents with    Leg Pain     left lower leg pain w/ swelling.   states was bitten by something x 2 days ago.  swelling from knee down + warmth and drainage lateral calf area.        HPI: Luca Ferraro 35 y.o. with tobacco abuse presents to the hospital with a chief complaint of lower extremity swelling. He reports since Friday he has had increasing lower extremity left swelling associated with an intermittent throbbing pain worsened with ambulation without alleviating factors. He has attempted no treatment at home. He has had increasing difficulty ambulating. He believes he was bitten by something that caused the initial swelling. He has been able to express pus and blood from the wound. He has had intermittent fevers at home. He denies fever, chest pain, SOB, N/V, abdominal pain, dizziness, dysuria, syncope. He smokes 1/2ppd he does not drink or use recreational drugs.     In the ED, CT with extensive cellulitis with small fluid seen overlying the thickened superifical fascia of the posterior leg, febrile to 101.8F and WBC of 12.03.     History reviewed. No pertinent past medical history.    History reviewed. No pertinent surgical history.    Review of patient's allergies indicates:  No Known Allergies    No current facility-administered medications on file prior to encounter.      Current Outpatient Medications on File Prior to Encounter   Medication Sig    oxyCODONE (OXYCONTIN) 10 mg 12 hr tablet Take 10 mg by mouth every 12 (twelve) hours as needed for Pain.     Family  History     None        Tobacco Use    Smoking status: Current Every Day Smoker     Packs/day: 1.00     Types: Cigarettes    Smokeless tobacco: Never Used   Substance and Sexual Activity    Alcohol use: Yes    Drug use: Not Currently     Types: Marijuana    Sexual activity: Yes     Review of Systems   Constitutional: Positive for fever. Negative for chills.   HENT: Negative for nosebleeds and tinnitus.    Eyes: Negative for photophobia and visual disturbance.   Respiratory: Negative for shortness of breath and wheezing.    Cardiovascular: Negative for chest pain, palpitations and leg swelling.   Gastrointestinal: Negative for abdominal distention, nausea and vomiting.   Genitourinary: Negative for dysuria, flank pain and hematuria.   Musculoskeletal: Negative for gait problem and joint swelling.   Skin: Positive for color change, rash and wound.   Neurological: Negative for seizures and syncope.     Objective:     Vital Signs (Most Recent):  Temp: 99 °F (37.2 °C) (11/16/20 2053)  Pulse: 109 (11/16/20 2053)  Resp: 17 (11/16/20 2053)  BP: (!) 146/90 (11/16/20 2053)  SpO2: 100 % (11/16/20 2053) Vital Signs (24h Range):  Temp:  [99 °F (37.2 °C)-101.8 °F (38.8 °C)] 99 °F (37.2 °C)  Pulse:  [106-130] 109  Resp:  [17-18] 17  SpO2:  [100 %] 100 %  BP: (127-159)/(80-90) 146/90     Weight: 119.3 kg (263 lb)  Body mass index is 36.68 kg/m².    Physical Exam  Vitals signs and nursing note reviewed.   Constitutional:       General: He is not in acute distress.     Appearance: He is well-developed.   HENT:      Head: Normocephalic and atraumatic.   Eyes:      General:         Right eye: No discharge.         Left eye: No discharge.      Conjunctiva/sclera: Conjunctivae normal.   Neck:      Musculoskeletal: Normal range of motion.      Thyroid: No thyromegaly.   Cardiovascular:      Rate and Rhythm: Normal rate and regular rhythm.      Heart sounds: No murmur.   Pulmonary:      Effort: Pulmonary effort is normal. No  respiratory distress.      Breath sounds: Normal breath sounds.   Abdominal:      General: Bowel sounds are normal. There is no distension.      Palpations: Abdomen is soft. There is no mass.      Tenderness: There is no abdominal tenderness.   Musculoskeletal:         General: Swelling, tenderness and signs of injury present. No deformity.      Right lower leg: Edema present.      Comments: Picture placed in chart, cellulitis outlined   Skin:     General: Skin is warm and dry.   Neurological:      Mental Status: He is alert and oriented to person, place, and time.   Psychiatric:         Behavior: Behavior normal.             Significant Labs:   CBC:   Recent Labs   Lab 11/16/20  1612   WBC 12.03   HGB 15.6   HCT 46.0        CMP:   Recent Labs   Lab 11/16/20  1612      K 3.5      CO2 24   *   BUN 5*   CREATININE 1.1   CALCIUM 9.4   PROT 8.1   ALBUMIN 3.9   BILITOT 0.5   ALKPHOS 76   AST 21   ALT 16   ANIONGAP 14   EGFRNONAA >60       Significant Imaging:   Imaging Results          CT Leg (Tibia-Fibula) Wtih Contrast Left (Final result)  Result time 11/16/20 19:05:54    Final result by Sharmaine Ramos MD (11/16/20 19:05:54)                 Impression:      Extensive cellulitis, as above described.  Small fluid seen particularly overlying the thickened superficial fascia of the posterior lower leg.  No bony abnormality detected.      Electronically signed by: Sharmaine Ramos  Date:    11/16/2020  Time:    19:05             Narrative:    EXAMINATION:  CT LEG WITH CONTRAST LEFT    CLINICAL HISTORY:  Lower leg swelling/redness, cellulitis suspected;    TECHNIQUE:  1.25 mm unenhanced axial images were obtained through the left lower leg.  Coronal and sagittal reformatted images were provided.    COMPARISON:  None.    FINDINGS:  Extensive reticular pattern in diffuse thickening of the subcutaneous tissue are seen particularly at the lateral and posterior aspect of the lower extremity.  There  is thickening of the underlying superficial fascia in the posterior lower leg.  There is a crescent of fluid seen at the posterior aspect of the middle 3rd of the leg overlying the posterior musculature.  There is a tiny suprapatellar effusion.  There is no acute fracture or dislocation.  There is no bony destruction.                                    Assessment/Plan:     * Cellulitis of left lower extremity  With complaints of lower extremity swelling, and a wound that occurred while working outside from a bite. Afebrile without leukocytosis, CT with cellulitis. Sensation intact and distal pulse intact. Picture in chart  -started on rocephin/vanc  -blood cultures pending  -ortho consulted    Sepsis  He meets criteria for sepsis based on fever to 101.8F, tachycardia to 101.8F, and soruce of infection as antibiotics  -see above  -has been started on antibiotics/fluids with cultures pending  -lactic acid pending    Tobacco abuse  Greater than 3 minutes spent counseling patient on dangers of continued tobacco abuse. Will provide tobacco cessation education prior to discharge.     VTE Risk Mitigation (From admission, onward)         Ordered     IP VTE HIGH RISK PATIENT  Once      11/16/20 2139     Place sequential compression device  Until discontinued      11/16/20 1952     Place KELVIN hose  Until discontinued      11/16/20 1952              VTE: holding lovenox for now should surgery be required  Code: Full  Diet: regular, NPO midnight  Dispo: pending treatment with antibiotics and ortho eval  As clarification, on 11/16/2020, patient should be admitted for hospital observation services under my care in collaboration with Cas Martinez MD. Carlyle Pak PA-C  Department of Hospital Medicine   [facility]

## 2020-11-17 NOTE — PROGRESS NOTES
[facility] Medicine  Progress Note    Patient Name: Luca Ferraro  MRN: [id number]  Patient Class: OP- Observation   Admission Date: 11/16/2020  Length of Stay: 0 days  Attending Physician: Lukasz Camp MD  Primary Care Provider: Primary Doctor No        Subjective:     Principal Problem:Cellulitis of left lower extremity        HPI:  Luca Ferraro 35 y.o. with tobacco abuse presents to the hospital with a chief complaint of lower extremity swelling. He reports since Friday he has had increasing lower extremity left swelling associated with an intermittent throbbing pain worsened with ambulation without alleviating factors. He has attempted no treatment at home. He has had increasing difficulty ambulating. He believes he was bitten by something that caused the initial swelling. He has been able to express pus and blood from the wound. He has had intermittent fevers at home. He denies fever, chest pain, SOB, N/V, abdominal pain, dizziness, dysuria, syncope. He smokes 1/2ppd he does not drink or use recreational drugs.     In the ED, CT with extensive cellulitis with small fluid seen overlying the thickened superifical fascia of the posterior leg, febrile to 101.8F and WBC of 12.03.     Overview/Hospital Course:  35-year-old  male presented for evaluation of left lower extremity swelling around the area of the catheterization.  Clinical assessment finds an extremely swollen calve much larger in comparison with the other calve.  There is an area that appears to be a puncture wound from some formed insect/crit her that may a bit in the patient with swelling and streaking down toward the ankle.  The area is warm and painful to touch.  There is decreased range of motion in lower extremity and pain upon standing.  Patient was started on dual IV antibiotics overnight and surgery consulted.  CT lower lid with contrast reveals an extensive cellulitis with small fluid seen  overlying the thickening superficial fascia of the posterior lower leg.  Also noted on CT is a suprapatellar effusion.    Plan:  Continue dual antibiotics awaiting surgery consult.    History reviewed. No pertinent past medical history.    History reviewed. No pertinent surgical history.    Review of patient's allergies indicates:  No Known Allergies    No current facility-administered medications on file prior to encounter.      Current Outpatient Medications on File Prior to Encounter   Medication Sig    oxyCODONE (OXYCONTIN) 10 mg 12 hr tablet Take 10 mg by mouth every 12 (twelve) hours as needed for Pain.     Family History     None        Tobacco Use    Smoking status: Current Every Day Smoker     Packs/day: 1.00     Types: Cigarettes    Smokeless tobacco: Never Used   Substance and Sexual Activity    Alcohol use: Yes    Drug use: Not Currently     Types: Marijuana    Sexual activity: Yes     Review of Systems   Constitutional: Positive for fever. Negative for chills.   HENT: Negative for nosebleeds and tinnitus.    Eyes: Negative for photophobia and visual disturbance.   Respiratory: Negative for shortness of breath and wheezing.    Cardiovascular: Negative for chest pain, palpitations and leg swelling.   Gastrointestinal: Negative for abdominal distention, nausea and vomiting.   Genitourinary: Negative for dysuria, flank pain and hematuria.   Musculoskeletal: Negative for gait problem and joint swelling.   Skin: Positive for color change, rash and wound.   Neurological: Negative for seizures and syncope.     Objective:     Vital Signs (Most Recent):  Temp: 98.2 °F (36.8 °C) (11/17/20 1135)  Pulse: 106 (11/17/20 1211)  Resp: 10 (11/17/20 1502)  BP: 123/66 (11/17/20 1135)  SpO2: 96 % (11/17/20 1211) Vital Signs (24h Range):  Temp:  [98.1 °F (36.7 °C)-101.8 °F (38.8 °C)] 98.2 °F (36.8 °C)  Pulse:  [] 106  Resp:  [10-18] 10  SpO2:  [96 %-100 %] 96 %  BP: (123-159)/(66-90) 123/66     Weight: 113.3 kg  (249 lb 12.5 oz)  Body mass index is 34.84 kg/m².    Physical Exam  Vitals signs and nursing note reviewed.   Constitutional:       General: He is not in acute distress.     Appearance: He is well-developed.   HENT:      Head: Normocephalic and atraumatic.   Eyes:      General:         Right eye: No discharge.         Left eye: No discharge.      Conjunctiva/sclera: Conjunctivae normal.   Neck:      Musculoskeletal: Normal range of motion.      Thyroid: No thyromegaly.   Cardiovascular:      Rate and Rhythm: Normal rate and regular rhythm.      Heart sounds: No murmur.   Pulmonary:      Effort: Pulmonary effort is normal. No respiratory distress.      Breath sounds: Normal breath sounds.   Abdominal:      General: Bowel sounds are normal. There is no distension.      Palpations: Abdomen is soft. There is no mass.      Tenderness: There is no abdominal tenderness.   Musculoskeletal:         General: Swelling, tenderness and signs of injury present. No deformity.      Right lower leg: Edema present.      Comments: Picture placed in chart, cellulitis outlined:  Decrease lower extremity range of motion and pain on standing   Skin:     General: Skin is warm and dry.   Neurological:      Mental Status: He is alert and oriented to person, place, and time.   Psychiatric:         Behavior: Behavior normal.             Significant Labs:   CBC:   Recent Labs   Lab 11/16/20  1612 11/17/20  0423   WBC 12.03 9.11   HGB 15.6 13.1*   HCT 46.0 40.0    220     CMP:   Recent Labs   Lab 11/16/20  1612 11/17/20  0423    137   K 3.5 3.6    103   CO2 24 25   * 93   BUN 5* 5*   CREATININE 1.1 0.9   CALCIUM 9.4 8.3*   PROT 8.1 6.2   ALBUMIN 3.9 2.9*   BILITOT 0.5 0.6   ALKPHOS 76 54*   AST 21 12   ALT 16 10   ANIONGAP 14 9   EGFRNONAA >60 >60       Significant Imaging:   Imaging Results          CT Leg (Tibia-Fibula) Wtih Contrast Left (Final result)  Result time 11/16/20 19:05:54    Final result by Sharmaine HALL  MD Richard (11/16/20 19:05:54)                 Impression:      Extensive cellulitis, as above described.  Small fluid seen particularly overlying the thickened superficial fascia of the posterior lower leg.  No bony abnormality detected.      Electronically signed by: Sharmaine Ramos  Date:    11/16/2020  Time:    19:05             Narrative:    EXAMINATION:  CT LEG WITH CONTRAST LEFT    CLINICAL HISTORY:  Lower leg swelling/redness, cellulitis suspected;    TECHNIQUE:  1.25 mm unenhanced axial images were obtained through the left lower leg.  Coronal and sagittal reformatted images were provided.    COMPARISON:  None.    FINDINGS:  Extensive reticular pattern in diffuse thickening of the subcutaneous tissue are seen particularly at the lateral and posterior aspect of the lower extremity.  There is thickening of the underlying superficial fascia in the posterior lower leg.  There is a crescent of fluid seen at the posterior aspect of the middle 3rd of the leg overlying the posterior musculature.  There is a tiny suprapatellar effusion.  There is no acute fracture or dislocation.  There is no bony destruction.                                      Assessment/Plan:      * Cellulitis of left lower extremity  11/16/2020:  With complaints of lower extremity swelling, and a wound that occurred while working outside from a bite. Afebrile without leukocytosis, CT with cellulitis. Sensation intact and distal pulse intact. Picture in chart  -started on rocephin/vanc  -blood cultures pending  -ortho consulted    11/17/2020:  Patient fever I will at the time of presentation with P temp 101.8°.  Started on dual antibiotics CT shows cellulitis that is consistent with his clinical physical assessment.  Decreased range of motion and pain on standing and to touch extremity warm with streaking.  Continue dual antibiotics awaiting surgery opinion likely home in a.m. if improves    Sepsis  He meets criteria for sepsis based on fever  to 101.8F, tachycardia to 101.8F, and soruce of infection as antibiotics  -see above  -has been started on antibiotics/fluids with cultures pending  -lactic acid pending    Tobacco abuse  Greater than 3 minutes spent counseling patient on dangers of continued tobacco abuse. Will provide tobacco cessation education prior to discharge.       VTE Risk Mitigation (From admission, onward)         Ordered     IP VTE HIGH RISK PATIENT  Once      11/16/20 2139     Place sequential compression device  Until discontinued      11/16/20 1952     Place KELVIN hose  Until discontinued      11/16/20 1952                Discharge Planning   GURVINDER:      Code Status: Full Code   Is the patient medically ready for discharge?:     Reason for patient still in hospital (select all that apply): Treatment  Discharge Plan A: Home(with [facility] information)            Nola Segovia, YOEL, APRN, FNP-C  Hospitalist - [facility] Medicine  [address] [place] [address] [place] [address]  Office [phone]; Pager [phone]        Note: Portions of this note dictated using MModal Fluency Direct - variations and voice recognition errors may exist and occasionally missed on review due to auto correct and voice representation.

## 2020-11-17 NOTE — NURSING
Called [facility] @[phone] for Dr Abernathy. [facility] patient is under Dr Roberson/Ling alvarado. Received message from  Ling to keep patient NPO until  She comes back to see him

## 2020-11-17 NOTE — SUBJECTIVE & OBJECTIVE
History reviewed. No pertinent past medical history.    History reviewed. No pertinent surgical history.    Review of patient's allergies indicates:  No Known Allergies    No current facility-administered medications on file prior to encounter.      Current Outpatient Medications on File Prior to Encounter   Medication Sig    oxyCODONE (OXYCONTIN) 10 mg 12 hr tablet Take 10 mg by mouth every 12 (twelve) hours as needed for Pain.     Family History     None        Tobacco Use    Smoking status: Current Every Day Smoker     Packs/day: 1.00     Types: Cigarettes    Smokeless tobacco: Never Used   Substance and Sexual Activity    Alcohol use: Yes    Drug use: Not Currently     Types: Marijuana    Sexual activity: Yes     Review of Systems   Constitutional: Positive for fever. Negative for chills.   HENT: Negative for nosebleeds and tinnitus.    Eyes: Negative for photophobia and visual disturbance.   Respiratory: Negative for shortness of breath and wheezing.    Cardiovascular: Negative for chest pain, palpitations and leg swelling.   Gastrointestinal: Negative for abdominal distention, nausea and vomiting.   Genitourinary: Negative for dysuria, flank pain and hematuria.   Musculoskeletal: Negative for gait problem and joint swelling.   Skin: Positive for color change, rash and wound.   Neurological: Negative for seizures and syncope.     Objective:     Vital Signs (Most Recent):  Temp: 98.2 °F (36.8 °C) (11/17/20 1135)  Pulse: 106 (11/17/20 1211)  Resp: 10 (11/17/20 1502)  BP: 123/66 (11/17/20 1135)  SpO2: 96 % (11/17/20 1211) Vital Signs (24h Range):  Temp:  [98.1 °F (36.7 °C)-101.8 °F (38.8 °C)] 98.2 °F (36.8 °C)  Pulse:  [] 106  Resp:  [10-18] 10  SpO2:  [96 %-100 %] 96 %  BP: (123-159)/(66-90) 123/66     Weight: 113.3 kg (249 lb 12.5 oz)  Body mass index is 34.84 kg/m².    Physical Exam  Vitals signs and nursing note reviewed.   Constitutional:       General: He is not in acute distress.     Appearance:  He is well-developed.   HENT:      Head: Normocephalic and atraumatic.   Eyes:      General:         Right eye: No discharge.         Left eye: No discharge.      Conjunctiva/sclera: Conjunctivae normal.   Neck:      Musculoskeletal: Normal range of motion.      Thyroid: No thyromegaly.   Cardiovascular:      Rate and Rhythm: Normal rate and regular rhythm.      Heart sounds: No murmur.   Pulmonary:      Effort: Pulmonary effort is normal. No respiratory distress.      Breath sounds: Normal breath sounds.   Abdominal:      General: Bowel sounds are normal. There is no distension.      Palpations: Abdomen is soft. There is no mass.      Tenderness: There is no abdominal tenderness.   Musculoskeletal:         General: Swelling, tenderness and signs of injury present. No deformity.      Right lower leg: Edema present.      Comments: Picture placed in chart, cellulitis outlined:  Decrease lower extremity range of motion and pain on standing   Skin:     General: Skin is warm and dry.   Neurological:      Mental Status: He is alert and oriented to person, place, and time.   Psychiatric:         Behavior: Behavior normal.             Significant Labs:   CBC:   Recent Labs   Lab 11/16/20  1612 11/17/20  0423   WBC 12.03 9.11   HGB 15.6 13.1*   HCT 46.0 40.0    220     CMP:   Recent Labs   Lab 11/16/20  1612 11/17/20  0423    137   K 3.5 3.6    103   CO2 24 25   * 93   BUN 5* 5*   CREATININE 1.1 0.9   CALCIUM 9.4 8.3*   PROT 8.1 6.2   ALBUMIN 3.9 2.9*   BILITOT 0.5 0.6   ALKPHOS 76 54*   AST 21 12   ALT 16 10   ANIONGAP 14 9   EGFRNONAA >60 >60       Significant Imaging:   Imaging Results          CT Leg (Tibia-Fibula) Wtih Contrast Left (Final result)  Result time 11/16/20 19:05:54    Final result by Sharmaine Ramos MD (11/16/20 19:05:54)                 Impression:      Extensive cellulitis, as above described.  Small fluid seen particularly overlying the thickened superficial fascia of the  posterior lower leg.  No bony abnormality detected.      Electronically signed by: Sharmaine Ramos  Date:    11/16/2020  Time:    19:05             Narrative:    EXAMINATION:  CT LEG WITH CONTRAST LEFT    CLINICAL HISTORY:  Lower leg swelling/redness, cellulitis suspected;    TECHNIQUE:  1.25 mm unenhanced axial images were obtained through the left lower leg.  Coronal and sagittal reformatted images were provided.    COMPARISON:  None.    FINDINGS:  Extensive reticular pattern in diffuse thickening of the subcutaneous tissue are seen particularly at the lateral and posterior aspect of the lower extremity.  There is thickening of the underlying superficial fascia in the posterior lower leg.  There is a crescent of fluid seen at the posterior aspect of the middle 3rd of the leg overlying the posterior musculature.  There is a tiny suprapatellar effusion.  There is no acute fracture or dislocation.  There is no bony destruction.

## 2020-11-17 NOTE — SUBJECTIVE & OBJECTIVE
History reviewed. No pertinent past medical history.    History reviewed. No pertinent surgical history.    Review of patient's allergies indicates:  No Known Allergies    No current facility-administered medications on file prior to encounter.      Current Outpatient Medications on File Prior to Encounter   Medication Sig    oxyCODONE (OXYCONTIN) 10 mg 12 hr tablet Take 10 mg by mouth every 12 (twelve) hours as needed for Pain.     Family History     None        Tobacco Use    Smoking status: Current Every Day Smoker     Packs/day: 1.00     Types: Cigarettes    Smokeless tobacco: Never Used   Substance and Sexual Activity    Alcohol use: Yes    Drug use: Not Currently     Types: Marijuana    Sexual activity: Yes     Review of Systems   Constitutional: Positive for fever. Negative for chills.   HENT: Negative for nosebleeds and tinnitus.    Eyes: Negative for photophobia and visual disturbance.   Respiratory: Negative for shortness of breath and wheezing.    Cardiovascular: Negative for chest pain, palpitations and leg swelling.   Gastrointestinal: Negative for abdominal distention, nausea and vomiting.   Genitourinary: Negative for dysuria, flank pain and hematuria.   Musculoskeletal: Negative for gait problem and joint swelling.   Skin: Positive for color change, rash and wound.   Neurological: Negative for seizures and syncope.     Objective:     Vital Signs (Most Recent):  Temp: 99 °F (37.2 °C) (11/16/20 2053)  Pulse: 109 (11/16/20 2053)  Resp: 17 (11/16/20 2053)  BP: (!) 146/90 (11/16/20 2053)  SpO2: 100 % (11/16/20 2053) Vital Signs (24h Range):  Temp:  [99 °F (37.2 °C)-101.8 °F (38.8 °C)] 99 °F (37.2 °C)  Pulse:  [106-130] 109  Resp:  [17-18] 17  SpO2:  [100 %] 100 %  BP: (127-159)/(80-90) 146/90     Weight: 119.3 kg (263 lb)  Body mass index is 36.68 kg/m².    Physical Exam  Vitals signs and nursing note reviewed.   Constitutional:       General: He is not in acute distress.     Appearance: He is  well-developed.   HENT:      Head: Normocephalic and atraumatic.   Eyes:      General:         Right eye: No discharge.         Left eye: No discharge.      Conjunctiva/sclera: Conjunctivae normal.   Neck:      Musculoskeletal: Normal range of motion.      Thyroid: No thyromegaly.   Cardiovascular:      Rate and Rhythm: Normal rate and regular rhythm.      Heart sounds: No murmur.   Pulmonary:      Effort: Pulmonary effort is normal. No respiratory distress.      Breath sounds: Normal breath sounds.   Abdominal:      General: Bowel sounds are normal. There is no distension.      Palpations: Abdomen is soft. There is no mass.      Tenderness: There is no abdominal tenderness.   Musculoskeletal:         General: Swelling, tenderness and signs of injury present. No deformity.      Right lower leg: Edema present.      Comments: Picture placed in chart, cellulitis outlined   Skin:     General: Skin is warm and dry.   Neurological:      Mental Status: He is alert and oriented to person, place, and time.   Psychiatric:         Behavior: Behavior normal.             Significant Labs:   CBC:   Recent Labs   Lab 11/16/20  1612   WBC 12.03   HGB 15.6   HCT 46.0        CMP:   Recent Labs   Lab 11/16/20  1612      K 3.5      CO2 24   *   BUN 5*   CREATININE 1.1   CALCIUM 9.4   PROT 8.1   ALBUMIN 3.9   BILITOT 0.5   ALKPHOS 76   AST 21   ALT 16   ANIONGAP 14   EGFRNONAA >60       Significant Imaging:   Imaging Results          CT Leg (Tibia-Fibula) Wtih Contrast Left (Final result)  Result time 11/16/20 19:05:54    Final result by Sharmaine Ramos MD (11/16/20 19:05:54)                 Impression:      Extensive cellulitis, as above described.  Small fluid seen particularly overlying the thickened superficial fascia of the posterior lower leg.  No bony abnormality detected.      Electronically signed by: Sharmaine Ramos  Date:    11/16/2020  Time:    19:05             Narrative:    EXAMINATION:  CT  LEG WITH CONTRAST LEFT    CLINICAL HISTORY:  Lower leg swelling/redness, cellulitis suspected;    TECHNIQUE:  1.25 mm unenhanced axial images were obtained through the left lower leg.  Coronal and sagittal reformatted images were provided.    COMPARISON:  None.    FINDINGS:  Extensive reticular pattern in diffuse thickening of the subcutaneous tissue are seen particularly at the lateral and posterior aspect of the lower extremity.  There is thickening of the underlying superficial fascia in the posterior lower leg.  There is a crescent of fluid seen at the posterior aspect of the middle 3rd of the leg overlying the posterior musculature.  There is a tiny suprapatellar effusion.  There is no acute fracture or dislocation.  There is no bony destruction.

## 2020-11-17 NOTE — PROGRESS NOTES
Pharmacokinetic Initial Assessment: IV Vancomycin    Assessment/Plan:    Initiate intravenous vancomycin with loading dose of 2000 mg once followed by a maintenance dose of vancomycin 2000 mg IV every 12 hours  Desired empiric serum trough concentration is 10 to 20 mcg/mL  Draw vancomycin trough level 60 min prior to fourth dose on 11/18/20 at approximately 03:00  Pharmacy will continue to follow and monitor vancomycin.      Please contact pharmacy at extension [phone] with any questions regarding this assessment.     Thank you for the consult,   Jacqueline Bradshaw       Patient brief summary:  Luca Ferraro is a 35 y.o. male initiated on antimicrobial therapy with IV Vancomycin for treatment of suspected skin & soft tissue infection    Drug Allergies:   Review of patient's allergies indicates:  No Known Allergies    Actual Body Weight:   119.3kg    Renal Function:   Estimated Creatinine Clearance: 124.3 mL/min (based on SCr of 1.1 mg/dL).,     Dialysis Method (if applicable):  N/A    CBC (last 72 hours):  Recent Labs   Lab Result Units 11/16/20  1612   WBC K/uL 12.03   Hemoglobin g/dL 15.6   Hematocrit % 46.0   Platelets K/uL 272   Gran % % 78.0*   Lymph % % 9.9*   Mono % % 11.3   Eosinophil % % 0.2   Basophil % % 0.2   Differential Method  Automated       Metabolic Panel (last 72 hours):  Recent Labs   Lab Result Units 11/16/20  1612   Sodium mmol/L 138   Potassium mmol/L 3.5   Chloride mmol/L 100   CO2 mmol/L 24   Glucose mg/dL 111*   BUN mg/dL 5*   Creatinine mg/dL 1.1   Albumin g/dL 3.9   Total Bilirubin mg/dL 0.5   Alkaline Phosphatase U/L 76   AST U/L 21   ALT U/L 16       Drug levels (last 3 results):  No results for input(s): VANCOMYCINRA, VANCOMYCINPE, VANCOMYCINTR in the last 72 hours.    Microbiologic Results:  Microbiology Results (last 7 days)       Procedure Component Value Units Date/Time    Blood culture #1 **CANNOT BE ORDERED STAT** [[id number]] Collected: 11/16/20 1612    Order Status: Sent  Specimen: Blood from Peripheral, Antecubital, Right Updated: 11/16/20 1644    Blood culture #2 **CANNOT BE ORDERED STAT** [[id number]] Collected: 11/16/20 1606    Order Status: Sent Specimen: Blood from Peripheral, Antecubital, Left Updated: 11/16/20 1644

## 2020-11-17 NOTE — NURSING
Pt resting in bed. Complaint of pain but refused prn pain medication. Vitals assessed. IV antibiotics infusing. Cont IVF. Lab at bedside. Pt is NPO. Call light within reach. Safety measures maintained

## 2020-11-17 NOTE — HOSPITAL COURSE
35-year-old  male presented for evaluation of left lower extremity swelling around the area of the catheterization.  Clinical assessment finds an extremely swollen calve much larger in comparison with the other calve.  There is an area that appears to be a puncture wound from some formed insect/crit her that may a bit in the patient with swelling and streaking down toward the ankle.  The area is warm and painful to touch.  There is decreased range of motion in lower extremity and pain upon standing.  Patient was started on dual IV antibiotics overnight and surgery consulted.  CT lower lid with contrast reveals an extensive cellulitis with small fluid seen overlying the thickening superficial fascia of the posterior lower leg.  Also noted on CT is a suprapatellar effusion.Orthopedic was consulted for I&D 2/2 to minimal improvement with dual ABX  Continued dual antibiotics, change Rocephin to zosyn continue Vanc; Await cultures -- temp overnight peak 100.7 with LLE swelling showing minimal improvement - continues to complain of pain on standing and decreased ROM - streaking from calve down back of leg - pain support.  S/P I&D on 11.18.20,culture grow MRSA,,leg still warm and swollen, continued with IV Abx.ID was following.  Had NSVT,resolved,cardiology was  following.electrolyte is normal.aurelia follow up as out patient.  Her cellulitis,abscess much improved,wound care was done by orthopedic,patient dat discharged home with PO Abx and follow up with PCP,cardiology and orthopedic as out patient.

## 2020-11-17 NOTE — NURSING
Patient complains of pain to left extremity with edema and warm to touch.  Elevated on pillow. Patient states that it hurts but refused analgesics. Pain is 3-4/10 PRS. Discussed call system, pain management and infection control. Call light in reach

## 2020-11-17 NOTE — PROGRESS NOTES
Vancomycin consult follow-up:    Patient reviewed, renal function stable, no new levels, continue current therapy; Next levels due: trough due 11/18/2020 at 0300

## 2020-11-18 ENCOUNTER — ANESTHESIA (OUTPATIENT)
Dept: SURGERY | Facility: HOSPITAL | Age: 35
DRG: 854 | End: 2020-11-18
Payer: MEDICAID

## 2020-11-18 ENCOUNTER — ANESTHESIA EVENT (OUTPATIENT)
Dept: SURGERY | Facility: HOSPITAL | Age: 35
DRG: 854 | End: 2020-11-18
Payer: MEDICAID

## 2020-11-18 PROBLEM — M79.662 PAIN IN LEFT LOWER LEG: Status: ACTIVE | Noted: 2020-11-18

## 2020-11-18 LAB
ALBUMIN SERPL BCP-MCNC: 2.8 G/DL (ref 3.5–5.2)
ALP SERPL-CCNC: 51 U/L (ref 55–135)
ALT SERPL W/O P-5'-P-CCNC: 17 U/L (ref 10–44)
ANION GAP SERPL CALC-SCNC: 6 MMOL/L (ref 8–16)
AST SERPL-CCNC: 20 U/L (ref 10–40)
BASOPHILS # BLD AUTO: 0.02 K/UL (ref 0–0.2)
BASOPHILS NFR BLD: 0.3 % (ref 0–1.9)
BILIRUB SERPL-MCNC: 0.4 MG/DL (ref 0.1–1)
BUN SERPL-MCNC: 5 MG/DL (ref 6–20)
CALCIUM SERPL-MCNC: 8.9 MG/DL (ref 8.7–10.5)
CHLORIDE SERPL-SCNC: 106 MMOL/L (ref 95–110)
CO2 SERPL-SCNC: 26 MMOL/L (ref 23–29)
CREAT SERPL-MCNC: 0.9 MG/DL (ref 0.5–1.4)
DIFFERENTIAL METHOD: ABNORMAL
EOSINOPHIL # BLD AUTO: 0.1 K/UL (ref 0–0.5)
EOSINOPHIL NFR BLD: 1.8 % (ref 0–8)
ERYTHROCYTE [DISTWIDTH] IN BLOOD BY AUTOMATED COUNT: 12.6 % (ref 11.5–14.5)
EST. GFR  (AFRICAN AMERICAN): >60 ML/MIN/1.73 M^2
EST. GFR  (NON AFRICAN AMERICAN): >60 ML/MIN/1.73 M^2
GLUCOSE SERPL-MCNC: 91 MG/DL (ref 70–110)
GRAM STN SPEC: NORMAL
GRAM STN SPEC: NORMAL
HCT VFR BLD AUTO: 37.6 % (ref 40–54)
HGB BLD-MCNC: 12.7 G/DL (ref 14–18)
IMM GRANULOCYTES # BLD AUTO: 0.03 K/UL (ref 0–0.04)
IMM GRANULOCYTES NFR BLD AUTO: 0.4 % (ref 0–0.5)
LYMPHOCYTES # BLD AUTO: 1.4 K/UL (ref 1–4.8)
LYMPHOCYTES NFR BLD: 18.5 % (ref 18–48)
MAGNESIUM SERPL-MCNC: 2.1 MG/DL (ref 1.6–2.6)
MCH RBC QN AUTO: 28.6 PG (ref 27–31)
MCHC RBC AUTO-ENTMCNC: 33.8 G/DL (ref 32–36)
MCV RBC AUTO: 85 FL (ref 82–98)
MONOCYTES # BLD AUTO: 1.2 K/UL (ref 0.3–1)
MONOCYTES NFR BLD: 15.9 % (ref 4–15)
NEUTROPHILS # BLD AUTO: 4.7 K/UL (ref 1.8–7.7)
NEUTROPHILS NFR BLD: 63.1 % (ref 38–73)
NRBC BLD-RTO: 0 /100 WBC
PHOSPHATE SERPL-MCNC: 3.7 MG/DL (ref 2.7–4.5)
PLATELET # BLD AUTO: 240 K/UL (ref 150–350)
PMV BLD AUTO: 9.9 FL (ref 9.2–12.9)
POTASSIUM SERPL-SCNC: 4.1 MMOL/L (ref 3.5–5.1)
PROT SERPL-MCNC: 6.4 G/DL (ref 6–8.4)
RBC # BLD AUTO: 4.44 M/UL (ref 4.6–6.2)
SODIUM SERPL-SCNC: 138 MMOL/L (ref 136–145)
VANCOMYCIN TROUGH SERPL-MCNC: 8.7 UG/ML (ref 10–22)
WBC # BLD AUTO: 7.42 K/UL (ref 3.9–12.7)

## 2020-11-18 PROCEDURE — 85025 COMPLETE CBC W/AUTO DIFF WBC: CPT

## 2020-11-18 PROCEDURE — 71000033 HC RECOVERY, INTIAL HOUR: Performed by: ORTHOPAEDIC SURGERY

## 2020-11-18 PROCEDURE — 25000003 PHARM REV CODE 250: Performed by: NURSE PRACTITIONER

## 2020-11-18 PROCEDURE — D9220A PRA ANESTHESIA: Mod: CRNA,,, | Performed by: NURSE ANESTHETIST, CERTIFIED REGISTERED

## 2020-11-18 PROCEDURE — 25000003 PHARM REV CODE 250: Performed by: PHYSICIAN ASSISTANT

## 2020-11-18 PROCEDURE — 37000009 HC ANESTHESIA EA ADD 15 MINS: Performed by: ORTHOPAEDIC SURGERY

## 2020-11-18 PROCEDURE — 87070 CULTURE OTHR SPECIMN AEROBIC: CPT

## 2020-11-18 PROCEDURE — 25000003 PHARM REV CODE 250: Performed by: EMERGENCY MEDICINE

## 2020-11-18 PROCEDURE — D9220A PRA ANESTHESIA: ICD-10-PCS | Mod: ANES,,, | Performed by: ANESTHESIOLOGY

## 2020-11-18 PROCEDURE — 63600175 PHARM REV CODE 636 W HCPCS: Performed by: STUDENT IN AN ORGANIZED HEALTH CARE EDUCATION/TRAINING PROGRAM

## 2020-11-18 PROCEDURE — 87186 SC STD MICRODIL/AGAR DIL: CPT

## 2020-11-18 PROCEDURE — 36415 COLL VENOUS BLD VENIPUNCTURE: CPT

## 2020-11-18 PROCEDURE — 36000706: Performed by: ORTHOPAEDIC SURGERY

## 2020-11-18 PROCEDURE — 87077 CULTURE AEROBIC IDENTIFY: CPT

## 2020-11-18 PROCEDURE — 36000707: Performed by: ORTHOPAEDIC SURGERY

## 2020-11-18 PROCEDURE — 25000003 PHARM REV CODE 250: Performed by: NURSE ANESTHETIST, CERTIFIED REGISTERED

## 2020-11-18 PROCEDURE — 63600175 PHARM REV CODE 636 W HCPCS: Performed by: EMERGENCY MEDICINE

## 2020-11-18 PROCEDURE — 25000003 PHARM REV CODE 250: Performed by: HOSPITALIST

## 2020-11-18 PROCEDURE — 80053 COMPREHEN METABOLIC PANEL: CPT

## 2020-11-18 PROCEDURE — 83735 ASSAY OF MAGNESIUM: CPT

## 2020-11-18 PROCEDURE — 96361 HYDRATE IV INFUSION ADD-ON: CPT

## 2020-11-18 PROCEDURE — 80202 ASSAY OF VANCOMYCIN: CPT

## 2020-11-18 PROCEDURE — 96376 TX/PRO/DX INJ SAME DRUG ADON: CPT

## 2020-11-18 PROCEDURE — 27200651 HC AIRWAY, LMA: Performed by: ANESTHESIOLOGY

## 2020-11-18 PROCEDURE — 63600175 PHARM REV CODE 636 W HCPCS: Performed by: NURSE ANESTHETIST, CERTIFIED REGISTERED

## 2020-11-18 PROCEDURE — 25000003 PHARM REV CODE 250: Performed by: ORTHOPAEDIC SURGERY

## 2020-11-18 PROCEDURE — 25000003 PHARM REV CODE 250: Performed by: STUDENT IN AN ORGANIZED HEALTH CARE EDUCATION/TRAINING PROGRAM

## 2020-11-18 PROCEDURE — 87075 CULTR BACTERIA EXCEPT BLOOD: CPT

## 2020-11-18 PROCEDURE — 37000008 HC ANESTHESIA 1ST 15 MINUTES: Performed by: ORTHOPAEDIC SURGERY

## 2020-11-18 PROCEDURE — 63600175 PHARM REV CODE 636 W HCPCS: Performed by: NURSE PRACTITIONER

## 2020-11-18 PROCEDURE — 11000001 HC ACUTE MED/SURG PRIVATE ROOM

## 2020-11-18 PROCEDURE — 84100 ASSAY OF PHOSPHORUS: CPT

## 2020-11-18 PROCEDURE — D9220A PRA ANESTHESIA: Mod: ANES,,, | Performed by: ANESTHESIOLOGY

## 2020-11-18 PROCEDURE — 87205 SMEAR GRAM STAIN: CPT

## 2020-11-18 PROCEDURE — D9220A PRA ANESTHESIA: ICD-10-PCS | Mod: CRNA,,, | Performed by: NURSE ANESTHETIST, CERTIFIED REGISTERED

## 2020-11-18 RX ORDER — ACETAMINOPHEN 500 MG
1000 TABLET ORAL EVERY 8 HOURS PRN
Status: DISCONTINUED | OUTPATIENT
Start: 2020-11-18 | End: 2020-11-22 | Stop reason: HOSPADM

## 2020-11-18 RX ORDER — LIDOCAINE HYDROCHLORIDE 20 MG/ML
INJECTION INTRAVENOUS
Status: DISCONTINUED | OUTPATIENT
Start: 2020-11-18 | End: 2020-11-18

## 2020-11-18 RX ORDER — ACETAMINOPHEN 500 MG
500 TABLET ORAL EVERY 6 HOURS PRN
Status: DISCONTINUED | OUTPATIENT
Start: 2020-11-18 | End: 2020-11-18

## 2020-11-18 RX ORDER — PROPOFOL 10 MG/ML
VIAL (ML) INTRAVENOUS
Status: DISCONTINUED | OUTPATIENT
Start: 2020-11-18 | End: 2020-11-18

## 2020-11-18 RX ORDER — SODIUM CHLORIDE, SODIUM LACTATE, POTASSIUM CHLORIDE, CALCIUM CHLORIDE 600; 310; 30; 20 MG/100ML; MG/100ML; MG/100ML; MG/100ML
INJECTION, SOLUTION INTRAVENOUS CONTINUOUS PRN
Status: DISCONTINUED | OUTPATIENT
Start: 2020-11-18 | End: 2020-11-18

## 2020-11-18 RX ORDER — HYDROCODONE BITARTRATE AND ACETAMINOPHEN 5; 325 MG/1; MG/1
1 TABLET ORAL ONCE
Status: COMPLETED | OUTPATIENT
Start: 2020-11-18 | End: 2020-11-18

## 2020-11-18 RX ORDER — MIDAZOLAM HYDROCHLORIDE 1 MG/ML
INJECTION, SOLUTION INTRAMUSCULAR; INTRAVENOUS
Status: DISCONTINUED | OUTPATIENT
Start: 2020-11-18 | End: 2020-11-18

## 2020-11-18 RX ORDER — TRAMADOL HYDROCHLORIDE 50 MG/1
100 TABLET ORAL EVERY 6 HOURS PRN
Status: DISCONTINUED | OUTPATIENT
Start: 2020-11-18 | End: 2020-11-22 | Stop reason: HOSPADM

## 2020-11-18 RX ORDER — FENTANYL CITRATE 50 UG/ML
INJECTION, SOLUTION INTRAMUSCULAR; INTRAVENOUS
Status: DISCONTINUED | OUTPATIENT
Start: 2020-11-18 | End: 2020-11-18

## 2020-11-18 RX ORDER — ONDANSETRON 2 MG/ML
INJECTION INTRAMUSCULAR; INTRAVENOUS
Status: DISCONTINUED | OUTPATIENT
Start: 2020-11-18 | End: 2020-11-18

## 2020-11-18 RX ADMIN — TRAMADOL HYDROCHLORIDE 100 MG: 50 TABLET, FILM COATED ORAL at 07:11

## 2020-11-18 RX ADMIN — FENTANYL CITRATE 50 MCG: 50 INJECTION INTRAMUSCULAR; INTRAVENOUS at 12:11

## 2020-11-18 RX ADMIN — ACETAMINOPHEN 500 MG: 500 TABLET ORAL at 04:11

## 2020-11-18 RX ADMIN — PIPERACILLIN AND TAZOBACTAM 4.5 G: 4; .5 INJECTION, POWDER, LYOPHILIZED, FOR SOLUTION INTRAVENOUS; PARENTERAL at 06:11

## 2020-11-18 RX ADMIN — HYDROCODONE BITARTRATE AND ACETAMINOPHEN 1 TABLET: 5; 325 TABLET ORAL at 03:11

## 2020-11-18 RX ADMIN — MIDAZOLAM HYDROCHLORIDE 2 MG: 1 INJECTION, SOLUTION INTRAMUSCULAR; INTRAVENOUS at 12:11

## 2020-11-18 RX ADMIN — SODIUM CHLORIDE, SODIUM LACTATE, POTASSIUM CHLORIDE, AND CALCIUM CHLORIDE: .6; .31; .03; .02 INJECTION, SOLUTION INTRAVENOUS at 12:11

## 2020-11-18 RX ADMIN — VANCOMYCIN HYDROCHLORIDE 2250 MG: 10 INJECTION, POWDER, LYOPHILIZED, FOR SOLUTION INTRAVENOUS at 03:11

## 2020-11-18 RX ADMIN — SODIUM CHLORIDE: 0.9 INJECTION, SOLUTION INTRAVENOUS at 06:11

## 2020-11-18 RX ADMIN — Medication 5 MG: at 12:11

## 2020-11-18 RX ADMIN — PROPOFOL 200 MG: 10 INJECTION, EMULSION INTRAVENOUS at 12:11

## 2020-11-18 RX ADMIN — VANCOMYCIN HYDROCHLORIDE 2000 MG: 10 INJECTION, POWDER, LYOPHILIZED, FOR SOLUTION INTRAVENOUS at 03:11

## 2020-11-18 RX ADMIN — FENTANYL CITRATE 50 MCG: 50 INJECTION INTRAMUSCULAR; INTRAVENOUS at 01:11

## 2020-11-18 RX ADMIN — ONDANSETRON 4 MG: 2 INJECTION, SOLUTION INTRAMUSCULAR; INTRAVENOUS at 12:11

## 2020-11-18 RX ADMIN — HYDROCODONE BITARTRATE AND ACETAMINOPHEN 1 TABLET: 5; 325 TABLET ORAL at 10:11

## 2020-11-18 NOTE — ANESTHESIA PROCEDURE NOTES
Intubation  Performed by: Kei Garica CRNA  Authorized by: Bryan Jean MD     Intubation:     Induction:  Intravenous    Intubated:  Postinduction    Mask Ventilation:  Easy mask    Attempts:  1    Attempted By:  Student    Difficult Airway Encountered?: No      Complications:  None    Airway Device:  Supraglottic airway/LMA    Airway Device Size:  5.0    Style/Cuff Inflation:  Cuffed    Secured at:  The lips    Placement Verified By:  Capnometry    Complicating Factors:  None    Findings Post-Intubation:  BS equal bilateral

## 2020-11-18 NOTE — SUBJECTIVE & OBJECTIVE
History reviewed. No pertinent past medical history.    History reviewed. No pertinent surgical history.    Review of patient's allergies indicates:  No Known Allergies    No current facility-administered medications on file prior to encounter.      Current Outpatient Medications on File Prior to Encounter   Medication Sig    oxyCODONE (OXYCONTIN) 10 mg 12 hr tablet Take 10 mg by mouth every 12 (twelve) hours as needed for Pain.     Family History     None        Tobacco Use    Smoking status: Current Every Day Smoker     Packs/day: 1.00     Types: Cigarettes    Smokeless tobacco: Never Used   Substance and Sexual Activity    Alcohol use: Yes    Drug use: Not Currently     Types: Marijuana    Sexual activity: Yes     Review of Systems   Constitutional: Positive for fever. Negative for chills.   HENT: Negative for nosebleeds and tinnitus.    Eyes: Negative for photophobia and visual disturbance.   Respiratory: Negative for shortness of breath and wheezing.    Cardiovascular: Negative for chest pain, palpitations and leg swelling.   Gastrointestinal: Negative for abdominal distention, nausea and vomiting.   Genitourinary: Negative for dysuria, flank pain and hematuria.   Musculoskeletal: Negative for gait problem and joint swelling.   Skin: Positive for color change, rash and wound.   Neurological: Negative for seizures and syncope.     Objective:     Vital Signs (Most Recent):  Temp: 97.7 °F (36.5 °C) (11/18/20 1443)  Pulse: 76 (11/18/20 1443)  Resp: 18 (11/18/20 1522)  BP: 125/78 (11/18/20 1443)  SpO2: 98 % (11/18/20 1443) Vital Signs (24h Range):  Temp:  [97.7 °F (36.5 °C)-100.7 °F (38.2 °C)] 97.7 °F (36.5 °C)  Pulse:  [75-91] 76  Resp:  [16-18] 18  SpO2:  [97 %-100 %] 98 %  BP: (115-127)/(55-78) 125/78     Weight: 113.3 kg (249 lb 12.5 oz)  Body mass index is 34.84 kg/m².    Physical Exam  Vitals signs and nursing note reviewed.   Constitutional:       General: He is not in acute distress.     Appearance: He  is well-developed.   HENT:      Head: Normocephalic and atraumatic.   Eyes:      General:         Right eye: No discharge.         Left eye: No discharge.      Conjunctiva/sclera: Conjunctivae normal.   Neck:      Musculoskeletal: Normal range of motion.      Thyroid: No thyromegaly.   Cardiovascular:      Rate and Rhythm: Normal rate and regular rhythm.      Heart sounds: No murmur.   Pulmonary:      Effort: Pulmonary effort is normal. No respiratory distress.      Breath sounds: Normal breath sounds.   Abdominal:      General: Bowel sounds are normal. There is no distension.      Palpations: Abdomen is soft. There is no mass.      Tenderness: There is no abdominal tenderness.   Musculoskeletal:         General: Swelling, tenderness and signs of injury present. No deformity.      Right lower leg: Edema present.      Comments: Picture placed in chart, cellulitis outlined:  Decrease lower extremity range of motion and pain on standing   Skin:     General: Skin is warm and dry.   Neurological:      Mental Status: He is alert and oriented to person, place, and time.   Psychiatric:         Behavior: Behavior normal.             Significant Labs:   CBC:   Recent Labs   Lab 11/16/20  1612 11/17/20  0423 11/18/20  0946   WBC 12.03 9.11 7.42   HGB 15.6 13.1* 12.7*   HCT 46.0 40.0 37.6*    220 240     CMP:   Recent Labs   Lab 11/16/20  1612 11/17/20  0423 11/18/20  0946    137 138   K 3.5 3.6 4.1    103 106   CO2 24 25 26   * 93 91   BUN 5* 5* 5*   CREATININE 1.1 0.9 0.9   CALCIUM 9.4 8.3* 8.9   PROT 8.1 6.2 6.4   ALBUMIN 3.9 2.9* 2.8*   BILITOT 0.5 0.6 0.4   ALKPHOS 76 54* 51*   AST 21 12 20   ALT 16 10 17   ANIONGAP 14 9 6*   EGFRNONAA >60 >60 >60       Significant Imaging:   Imaging Results          CT Leg (Tibia-Fibula) Wtih Contrast Left (Final result)  Result time 11/16/20 19:05:54    Final result by Sharmaine Ramos MD (11/16/20 19:05:54)                 Impression:      Extensive  cellulitis, as above described.  Small fluid seen particularly overlying the thickened superficial fascia of the posterior lower leg.  No bony abnormality detected.      Electronically signed by: Sharmaine Ramos  Date:    11/16/2020  Time:    19:05             Narrative:    EXAMINATION:  CT LEG WITH CONTRAST LEFT    CLINICAL HISTORY:  Lower leg swelling/redness, cellulitis suspected;    TECHNIQUE:  1.25 mm unenhanced axial images were obtained through the left lower leg.  Coronal and sagittal reformatted images were provided.    COMPARISON:  None.    FINDINGS:  Extensive reticular pattern in diffuse thickening of the subcutaneous tissue are seen particularly at the lateral and posterior aspect of the lower extremity.  There is thickening of the underlying superficial fascia in the posterior lower leg.  There is a crescent of fluid seen at the posterior aspect of the middle 3rd of the leg overlying the posterior musculature.  There is a tiny suprapatellar effusion.  There is no acute fracture or dislocation.  There is no bony destruction.

## 2020-11-18 NOTE — CONSULTS
[facility]  Orthopedics  Consult Note    Patient Name: Luca Ferraro  MRN: [id number]  Admission Date: 11/16/2020  Hospital Length of Stay: 0 days  Attending Provider: Lukasz Camp MD  Primary Care Provider: Primary Doctor No    Patient information was obtained from patient and ER records.     Consults  Subjective:     Principal Problem:Cellulitis of left lower extremity    Chief Complaint:   Chief Complaint   Patient presents with    Leg Pain     left lower leg pain w/ swelling.   states was bitten by something x 2 days ago.  swelling from knee down + warmth and drainage lateral calf area.        HPI: Patient presented to the hospital with right pain and swelling. He reports since Friday he has had increasing left calf swelling and intermittent pain worsened with ambulation. He believes he was bitten by something but is unclear what. He has been able to express pus and blood from the wound on the posterior aspect of his calf. He has had intermittent low fevers at home. He denies chest pain, SOB, N/V, abdominal pain, dizziness, dysuria, syncope.      In the ED, he was noted to have cellulitis. A CT was performed which showed extensive cellulitis with small fluid seen overlying the thickened superifical fascia of the posterior leg. He was admitted to the floor and started on rocephin and vancomycin. He reports a slight improvement in his pain since beginning antibiotics.     History reviewed. No pertinent past medical history.    History reviewed. No pertinent surgical history.    Review of patient's allergies indicates:  No Known Allergies    Current Facility-Administered Medications   Medication    0.9%  NaCl infusion    acetaminophen tablet 500 mg    cefTRIAXone (ROCEPHIN) 2 g/50 mL D5W IVPB    dextrose 50% injection 12.5 g    dextrose 50% injection 25 g    glucagon (human recombinant) injection 1 mg    glucose chewable tablet 16 g    glucose chewable tablet 24 g     "HYDROcodone-acetaminophen 5-325 mg per tablet 1 tablet    melatonin tablet 6 mg    senna-docusate 8.6-50 mg per tablet 1 tablet    sodium chloride 0.9% flush 10 mL    vancomycin (VANCOCIN) 2,000 mg in dextrose 5 % 500 mL IVPB     Family History     None        Tobacco Use    Smoking status: Current Every Day Smoker     Packs/day: 1.00     Types: Cigarettes    Smokeless tobacco: Never Used   Substance and Sexual Activity    Alcohol use: Yes    Drug use: Not Currently     Types: Marijuana    Sexual activity: Yes     ROS  Objective:     Vital Signs (Most Recent):  Temp: 99.2 °F (37.3 °C) (11/17/20 1703)  Pulse: 80 (11/17/20 1703)  Resp: 18 (11/17/20 1703)  BP: 117/62 (11/17/20 1703)  SpO2: 97 % (11/17/20 1703) Vital Signs (24h Range):  Temp:  [98.1 °F (36.7 °C)-101.8 °F (38.8 °C)] 99.2 °F (37.3 °C)  Pulse:  [] 80  Resp:  [10-18] 18  SpO2:  [96 %-100 %] 97 %  BP: (117-146)/(62-90) 117/62     Weight: 113.3 kg (249 lb 12.5 oz)  Height: 5' 11" (180.3 cm)  Body mass index is 34.84 kg/m².      Intake/Output Summary (Last 24 hours) at 11/17/2020 1930  Last data filed at 11/17/2020 1615  Gross per 24 hour   Intake 2358.33 ml   Output --   Net 2358.33 ml       Ortho/SPM Exam     Patient alert, oriented, in no acute distress. Appears comfortable lying in bed.   LLE Exam: Small circular wound over the posterolateral aspect of the calf. No drainage or purulence from the wound, appears scabbed over. Diffuse swelling of the posterior calf. No fluctuance. Compartments are soft on palpation. No pain with active or passive motion of the ankle or toes. Tender to palpation over the calf, but not severe. Erythema seems to have receded slightly from the previously marked borders. Sensation intact to light touch. Brisk capillary refill to toes.     Significant Labs: All pertinent labs within the past 24 hours have been reviewed.    Significant Imaging: CT left tib fib reviewed: Extensive cellulitis. Small amount of fluid seen " particularly overlying the thickened superficial fascia of the posterior lower leg. No bony abnormalities. No gas.    Assessment/Plan:     Active Diagnoses:    Diagnosis Date Noted POA    PRINCIPAL PROBLEM:  Cellulitis of left lower extremity [L03.116] 11/16/2020 Yes    Tobacco abuse [Z72.0] 11/16/2020 Unknown    Sepsis [A41.9] 11/16/2020 Yes      Problems Resolved During this Admission:     35 year old male with left lower extremity cellulitis.     Plan:   - Does not appear septic at this time. Fluid collection seen on CT is quite small.   - Continue antibiotics for cellulitis.   - Patient may eat tonight. NPO at midnight.  Will reassess patient in the morning.       Erika Hidalgo MD  Orthopedics  [facility] [place]

## 2020-11-18 NOTE — ANESTHESIA PREPROCEDURE EVALUATION
11/18/2020  Luca Ferraro is a 35 y.o., male.    Anesthesia Evaluation    I have reviewed the Patient Summary Reports.    I have reviewed the Nursing Notes.       Review of Systems  Social:  Smoker, Alcohol Use H/o illicit use in the past   Cardiovascular:   Denies Pacemaker.  Denies Hypertension. Dysrhythmias (hx nonsustained Vtach)     Pulmonary:  Pulmonary Normal    Renal/:  Renal/ Normal     Hepatic/GI:  Hepatic/GI Normal    Musculoskeletal:   Left leg swelling/cellulitis-started on Vanc and Rocephin   Neurological:  Neurology Normal    Endocrine:  Endocrine Normal        Physical Exam  General:  Well nourished, Obesity    Airway/Jaw/Neck:  AIRWAY FINDINGS: Normal      Chest/Lungs:  Chest/Lungs Clear    Heart/Vascular:  Heart Findings: Normal       Mental Status:  Mental Status Findings: Normal      Wt Readings from Last 3 Encounters:   11/17/20 113.3 kg (249 lb 12.5 oz)   12/26/18 99.8 kg (220 lb)   11/20/18 90.7 kg (200 lb)     Temp Readings from Last 3 Encounters:   11/18/20 36.9 °C (98.4 °F) (Oral)   12/26/18 37.1 °C (98.8 °F) (Oral)   11/05/18 36.8 °C (98.2 °F) (Oral)     BP Readings from Last 3 Encounters:   11/18/20 115/65   12/26/18 (!) 150/91   11/20/18 (!) 146/81     Pulse Readings from Last 3 Encounters:   11/18/20 75   12/26/18 80   11/20/18 83     Lab Results   Component Value Date    WBC 9.11 11/17/2020    HGB 13.1 (L) 11/17/2020    HCT 40.0 11/17/2020    MCV 87 11/17/2020     11/17/2020       CMP  Sodium   Date Value Ref Range Status   11/17/2020 137 136 - 145 mmol/L Final     Potassium   Date Value Ref Range Status   11/17/2020 3.6 3.5 - 5.1 mmol/L Final     Chloride   Date Value Ref Range Status   11/17/2020 103 95 - 110 mmol/L Final     CO2   Date Value Ref Range Status   11/17/2020 25 23 - 29 mmol/L Final     Glucose   Date Value Ref Range Status   11/17/2020 93 70 - 110  mg/dL Final     BUN   Date Value Ref Range Status   11/17/2020 5 (L) 6 - 20 mg/dL Final     Creatinine   Date Value Ref Range Status   11/17/2020 0.9 0.5 - 1.4 mg/dL Final     Calcium   Date Value Ref Range Status   11/17/2020 8.3 (L) 8.7 - 10.5 mg/dL Final     Total Protein   Date Value Ref Range Status   11/17/2020 6.2 6.0 - 8.4 g/dL Final     Albumin   Date Value Ref Range Status   11/17/2020 2.9 (L) 3.5 - 5.2 g/dL Final     Total Bilirubin   Date Value Ref Range Status   11/17/2020 0.6 0.1 - 1.0 mg/dL Final     Comment:     For infants and newborns, interpretation of results should be based  on gestational age, weight and in agreement with clinical  observations.  Premature Infant recommended reference ranges:  Up to 24 hours.............<8.0 mg/dL  Up to 48 hours............<12.0 mg/dL  3-5 days..................<15.0 mg/dL  6-29 days.................<15.0 mg/dL       Alkaline Phosphatase   Date Value Ref Range Status   11/17/2020 54 (L) 55 - 135 U/L Final     AST   Date Value Ref Range Status   11/17/2020 12 10 - 40 U/L Final     ALT   Date Value Ref Range Status   11/17/2020 10 10 - 44 U/L Final     Anion Gap   Date Value Ref Range Status   11/17/2020 9 8 - 16 mmol/L Final     eGFR if    Date Value Ref Range Status   11/17/2020 >60 >60 mL/min/1.73 m^2 Final     eGFR if non    Date Value Ref Range Status   11/17/2020 >60 >60 mL/min/1.73 m^2 Final     Comment:     Calculation used to obtain the estimated glomerular filtration  rate (eGFR) is the CKD-EPI equation.        11/17/2020 COVID negative    Anesthesia Plan  Type of Anesthesia, risks & benefits discussed:  Anesthesia Type:  general  Patient's Preference:   Intra-op Monitoring Plan: standard ASA monitors  Intra-op Monitoring Plan Comments:   Post Op Pain Control Plan:   Post Op Pain Control Plan Comments:   Induction:   IV  Beta Blocker:  Patient is not currently on a Beta-Blocker (No further documentation required).        Informed Consent: Patient understands risks and agrees with Anesthesia plan.  Questions answered. Anesthesia consent signed with patient.  ASA Score: 2     Day of Surgery Review of History & Physical:  There are no significant changes.  H&P update referred to the provider.         Ready For Surgery From Anesthesia Perspective.

## 2020-11-18 NOTE — PROGRESS NOTES
[facility] Medicine  Progress Note    Patient Name: Luca Ferraro  MRN: [id number]  Patient Class: IP- Inpatient   Admission Date: 11/16/2020  Length of Stay: 0 days  Attending Physician: Lukasz Camp MD  Primary Care Provider: Primary Doctor No        Subjective:     Principal Problem:Cellulitis of left lower extremity        HPI:  Luca Ferraro 35 y.o. with tobacco abuse presents to the hospital with a chief complaint of lower extremity swelling. He reports since Friday he has had increasing lower extremity left swelling associated with an intermittent throbbing pain worsened with ambulation without alleviating factors. He has attempted no treatment at home. He has had increasing difficulty ambulating. He believes he was bitten by something that caused the initial swelling. He has been able to express pus and blood from the wound. He has had intermittent fevers at home. He denies fever, chest pain, SOB, N/V, abdominal pain, dizziness, dysuria, syncope. He smokes 1/2ppd he does not drink or use recreational drugs.     In the ED, CT with extensive cellulitis with small fluid seen overlying the thickened superifical fascia of the posterior leg, febrile to 101.8F and WBC of 12.03.     Overview/Hospital Course:  35-year-old  male presented for evaluation of left lower extremity swelling around the area of the catheterization.  Clinical assessment finds an extremely swollen calve much larger in comparison with the other calve.  There is an area that appears to be a puncture wound from some formed insect/crit her that may a bit in the patient with swelling and streaking down toward the ankle.  The area is warm and painful to touch.  There is decreased range of motion in lower extremity and pain upon standing.  Patient was started on dual IV antibiotics overnight and surgery consulted.  CT lower lid with contrast reveals an extensive cellulitis with small fluid seen  overlying the thickening superficial fascia of the posterior lower leg.  Also noted on CT is a suprapatellar effusion. Patient to OR today for I&D 2/2 to minimal improvement with dual ABX    Plan:  Continue dual antibiotics, change Rocephin to zosyn continue Vanc; Await cultures -- temp overnight peak 100.7 with LLE swelling showing minimal improvement - continues to complain of pain on standing and decreased ROM - streaking from calve down back of leg - pain support.    History reviewed. No pertinent past medical history.    History reviewed. No pertinent surgical history.    Review of patient's allergies indicates:  No Known Allergies    No current facility-administered medications on file prior to encounter.      Current Outpatient Medications on File Prior to Encounter   Medication Sig    oxyCODONE (OXYCONTIN) 10 mg 12 hr tablet Take 10 mg by mouth every 12 (twelve) hours as needed for Pain.     Family History     None        Tobacco Use    Smoking status: Current Every Day Smoker     Packs/day: 1.00     Types: Cigarettes    Smokeless tobacco: Never Used   Substance and Sexual Activity    Alcohol use: Yes    Drug use: Not Currently     Types: Marijuana    Sexual activity: Yes     Review of Systems   Constitutional: Positive for fever. Negative for chills.   HENT: Negative for nosebleeds and tinnitus.    Eyes: Negative for photophobia and visual disturbance.   Respiratory: Negative for shortness of breath and wheezing.    Cardiovascular: Negative for chest pain, palpitations and leg swelling.   Gastrointestinal: Negative for abdominal distention, nausea and vomiting.   Genitourinary: Negative for dysuria, flank pain and hematuria.   Musculoskeletal: Negative for gait problem and joint swelling.   Skin: Positive for color change, rash and wound.   Neurological: Negative for seizures and syncope.     Objective:     Vital Signs (Most Recent):  Temp: 97.7 °F (36.5 °C) (11/18/20 1443)  Pulse: 76 (11/18/20  1443)  Resp: 18 (11/18/20 1522)  BP: 125/78 (11/18/20 1443)  SpO2: 98 % (11/18/20 1443) Vital Signs (24h Range):  Temp:  [97.7 °F (36.5 °C)-100.7 °F (38.2 °C)] 97.7 °F (36.5 °C)  Pulse:  [75-91] 76  Resp:  [16-18] 18  SpO2:  [97 %-100 %] 98 %  BP: (115-127)/(55-78) 125/78     Weight: 113.3 kg (249 lb 12.5 oz)  Body mass index is 34.84 kg/m².    Physical Exam  Vitals signs and nursing note reviewed.   Constitutional:       General: He is not in acute distress.     Appearance: He is well-developed.   HENT:      Head: Normocephalic and atraumatic.   Eyes:      General:         Right eye: No discharge.         Left eye: No discharge.      Conjunctiva/sclera: Conjunctivae normal.   Neck:      Musculoskeletal: Normal range of motion.      Thyroid: No thyromegaly.   Cardiovascular:      Rate and Rhythm: Normal rate and regular rhythm.      Heart sounds: No murmur.   Pulmonary:      Effort: Pulmonary effort is normal. No respiratory distress.      Breath sounds: Normal breath sounds.   Abdominal:      General: Bowel sounds are normal. There is no distension.      Palpations: Abdomen is soft. There is no mass.      Tenderness: There is no abdominal tenderness.   Musculoskeletal:         General: Swelling, tenderness and signs of injury present. No deformity.      Right lower leg: Edema present.      Comments: Picture placed in chart, cellulitis outlined:  Decrease lower extremity range of motion and pain on standing   Skin:     General: Skin is warm and dry.   Neurological:      Mental Status: He is alert and oriented to person, place, and time.   Psychiatric:         Behavior: Behavior normal.             Significant Labs:   CBC:   Recent Labs   Lab 11/16/20  1612 11/17/20  0423 11/18/20  0946   WBC 12.03 9.11 7.42   HGB 15.6 13.1* 12.7*   HCT 46.0 40.0 37.6*    220 240     CMP:   Recent Labs   Lab 11/16/20  1612 11/17/20  0423 11/18/20  0946    137 138   K 3.5 3.6 4.1    103 106   CO2 24 25 26   *  93 91   BUN 5* 5* 5*   CREATININE 1.1 0.9 0.9   CALCIUM 9.4 8.3* 8.9   PROT 8.1 6.2 6.4   ALBUMIN 3.9 2.9* 2.8*   BILITOT 0.5 0.6 0.4   ALKPHOS 76 54* 51*   AST 21 12 20   ALT 16 10 17   ANIONGAP 14 9 6*   EGFRNONAA >60 >60 >60       Significant Imaging:   Imaging Results          CT Leg (Tibia-Fibula) Wtih Contrast Left (Final result)  Result time 11/16/20 19:05:54    Final result by Sharmaine Ramos MD (11/16/20 19:05:54)                 Impression:      Extensive cellulitis, as above described.  Small fluid seen particularly overlying the thickened superficial fascia of the posterior lower leg.  No bony abnormality detected.      Electronically signed by: Sharmaine Ramos  Date:    11/16/2020  Time:    19:05             Narrative:    EXAMINATION:  CT LEG WITH CONTRAST LEFT    CLINICAL HISTORY:  Lower leg swelling/redness, cellulitis suspected;    TECHNIQUE:  1.25 mm unenhanced axial images were obtained through the left lower leg.  Coronal and sagittal reformatted images were provided.    COMPARISON:  None.    FINDINGS:  Extensive reticular pattern in diffuse thickening of the subcutaneous tissue are seen particularly at the lateral and posterior aspect of the lower extremity.  There is thickening of the underlying superficial fascia in the posterior lower leg.  There is a crescent of fluid seen at the posterior aspect of the middle 3rd of the leg overlying the posterior musculature.  There is a tiny suprapatellar effusion.  There is no acute fracture or dislocation.  There is no bony destruction.                                      Assessment/Plan:      * Cellulitis of left lower extremity  11/18/20: Minimal improvement overnight, continues to complain of pain upon standing and decreased ROM - ortho following - to OR for I&D - follow cultures; change IV ABX to zosyn continue vanc - consult to ID    11/17/2020:  Patient fever I will at the time of presentation with P temp 101.8°.  Started on dual antibiotics  CT shows cellulitis that is consistent with his clinical physical assessment.  Decreased range of motion and pain on standing and to touch extremity warm with streaking.  Continue dual antibiotics awaiting surgery opinion likely home in a.m. if improves    11/16/2020:  With complaints of lower extremity swelling, and a wound that occurred while working outside from a bite. Afebrile without leukocytosis, CT with cellulitis. Sensation intact and distal pulse intact. Picture in chart  -started on rocephin/vanc  -blood cultures pending  -ortho consulted    Pain in left lower leg  2/2 cellulitis - he is reluctant to take pain medication due to past history of drug abuse; will order something light as he continues to complain of pain.      Sepsis  He meets criteria for sepsis based on fever to 101.8F, tachycardia to 101.8F, and soruce of infection as antibiotics  -see above  -has been started on antibiotics/fluids with cultures pending  -lactic acid pending    Tobacco abuse  Greater than 3 minutes spent counseling patient on dangers of continued tobacco abuse. Will provide tobacco cessation education prior to discharge.       VTE Risk Mitigation (From admission, onward)         Ordered     IP VTE HIGH RISK PATIENT  Once      11/16/20 2139     Place sequential compression device  Until discontinued      11/16/20 1952     Place KELVIN hose  Until discontinued      11/16/20 1952                Discharge Planning   GURVINDER:      Code Status: Full Code   Is the patient medically ready for discharge?:     Reason for patient still in hospital (select all that apply): Patient unstable, Patient trending condition, Treatment and Consult recommendations  Discharge Plan A: Home(with [facility] information)          Nola Segovia, YOEL, APRN, FNP-C  Department of [facility] Medicine - [facility]  [address] [place] [address]  Office [phone]; Pager [phone]

## 2020-11-18 NOTE — PROGRESS NOTES
WRITTEN HEALTHCARE DISCHARGE INFORMATION      Things that YOU are RESPONSIBLE for to Manage Your Care At Home:     1. Getting your prescriptions filled.  2. Taking you medications as directed. DO NOT MISS ANY DOSES!  3. Going to your follow-up doctor appointments. This is important because it allows the doctor to monitor your progress and to determine if any changes need to be made to your treatment plan.     If you are unable to make your follow up appointments, please call the number listed and reschedule this appointment.      ____________HELP AT HOME____________________     Experiencing any SIGNS or SYMPTOMS: YOU CAN     Schedule a same day appopintment with your Primary Care Doctor or  you can call [facility] Line for 24/7 assistance at [phone]     If you are experience any signs or symptoms that have become severe, Call 911 and come to your nearest Emergency Room.     Thank you for [facility] and allowing us to care for you.   From your care management team:      You should receive a call from [facility] within 48-72 hours to help manage your care after discharge. Please try to make sure that you answer your phone for this important phone call.    Follow-up Information     [facility]    Why: call at time of discharge to schedule post hospital discharge with PCP as needed  Contact information:  [address] [place] [address]  [phone]

## 2020-11-18 NOTE — PROGRESS NOTES
Pharmacokinetic Assessment Follow Up: IV Vancomycin    Vancomycin serum concentration assessment(s):    The trough level was drawn correctly and can be used to guide therapy at this time. The measurement is below the desired definitive target range of 10 to 20 mcg/mL.    Vancomycin Regimen Plan:    Change regimen to Vancomycin 2250 mg IV every 12 hours with next serum trough concentration measured at 0300 prior to 4th dose on 11/20/2020    Drug levels (last 3 results):  Recent Labs   Lab Result Units 11/18/20  0247   Vancomycin-Trough ug/mL 8.7*       Pharmacy will continue to follow and monitor vancomycin.    Please contact pharmacy at extension [phone] for questions regarding this assessment.    Thank you for the consult,   Abner Espinoza       Patient brief summary:  Luca Ferraro is a 35 y.o. male initiated on antimicrobial therapy with IV Vancomycin for treatment of skin & soft tissue infection    The patient's current regimen is Vancomycin 2250mg q12h.    Drug Allergies:   Review of patient's allergies indicates:  No Known Allergies    Actual Body Weight:   113.3 kg    Renal Function:   Estimated Creatinine Clearance: 148 mL/min (based on SCr of 0.9 mg/dL).,     Dialysis Method (if applicable):  N/A    CBC (last 72 hours):  Recent Labs   Lab Result Units 11/16/20  1612 11/17/20  0423   WBC K/uL 12.03 9.11   Hemoglobin g/dL 15.6 13.1*   Hematocrit % 46.0 40.0   Platelets K/uL 272 220   Gran % % 78.0* 64.0   Lymph % % 9.9* 15.4*   Mono % % 11.3 19.0*   Eosinophil % % 0.2 0.8   Basophil % % 0.2 0.4   Differential Method  Automated Automated       Metabolic Panel (last 72 hours):  Recent Labs   Lab Result Units 11/16/20  1612 11/17/20  0423 11/17/20  1840   Sodium mmol/L 138 137  --    Potassium mmol/L 3.5 3.6  --    Chloride mmol/L 100 103  --    CO2 mmol/L 24 25  --    Glucose mg/dL 111* 93  --    Glucose, UA   --   --  Negative   BUN mg/dL 5* 5*  --    Creatinine mg/dL 1.1 0.9  --    Albumin g/dL 3.9  2.9*  --    Total Bilirubin mg/dL 0.5 0.6  --    Alkaline Phosphatase U/L 76 54*  --    AST U/L 21 12  --    ALT U/L 16 10  --        Vancomycin Administrations:  vancomycin given in the last 96 hours                     vancomycin (VANCOCIN) 2,000 mg in dextrose 5 % 500 mL IVPB (mg) 2,000 mg New Bag 11/18/20 0337     2,000 mg New Bag 11/17/20 1505     2,000 mg New Bag  0418    vancomycin (VANCOCIN) 2,000 mg in dextrose 5 % 500 mL IVPB (mg) 2,000 mg New Bag 11/16/20 1612                    Microbiologic Results:  Microbiology Results (last 7 days)       Procedure Component Value Units Date/Time    Blood culture #2 **CANNOT BE ORDERED STAT** [[id number]] Collected: 11/16/20 1606    Order Status: Completed Specimen: Blood from Peripheral, Antecubital, Left Updated: 11/17/20 1703     Blood Culture, Routine No Growth to date      No Growth to date    Blood culture #1 **CANNOT BE ORDERED STAT** [[id number]] Collected: 11/16/20 1612    Order Status: Completed Specimen: Blood from Peripheral, Antecubital, Right Updated: 11/17/20 1703     Blood Culture, Routine No Growth to date      No Growth to date

## 2020-11-18 NOTE — TRANSFER OF CARE
"Anesthesia Transfer of Care Note    Patient: Luca Ferraro    Procedure(s) Performed: Procedure(s) (LRB):  DEBRIDEMENT, LOWER EXTREMITY-  LOWER LEG (Left)    Patient location: PACU    Anesthesia Type: general    Transport from OR: Transported from OR on room air with adequate spontaneous ventilation    Post pain: adequate analgesia    Post assessment: no apparent anesthetic complications    Post vital signs: stable    Level of consciousness: awake, alert and oriented    Nausea/Vomiting: no nausea/vomiting    Complications: none    Transfer of care protocol was followed      Last vitals:   Visit Vitals  /76 (BP Location: Right arm, Patient Position: Lying)   Pulse 89   Temp 36.8 °C (98.2 °F) (Oral)   Resp 16   Ht 5' 11" (1.803 m)   Wt 113.3 kg (249 lb 12.5 oz)   SpO2 98%   BMI 34.84 kg/m²     "

## 2020-11-18 NOTE — PLAN OF CARE
Patient remains free from injury and falls. Denies pain. Jama fever 100.7, resolved with tylenol. IVF infusing and IV antibiotics administered per order. Patient remains NPO since midnight per order. Plan of care continued.

## 2020-11-18 NOTE — PROGRESS NOTES
No significant improvement in leg. Erythema slightly improved but area of fluctuance now consolidated to area around wound on left lateral lower leg. Plan for I&D left lower extremity with Dr Roberson today mid day. Will ashli and sign consents.

## 2020-11-18 NOTE — OP NOTE
[facility] [place] [place]  Surgery Department  Operative Note    SUMMARY     Date of Procedure: 11/18/2020     Procedure: Procedure(s) (LRB):  DEBRIDEMENT, LOWER EXTREMITY-  LOWER LEG (Left)     Surgeon(s) and Role:     * Juliocesar Roberson MD - Primary    Assisting Surgeon: Lyle    Pre-Operative Diagnosis: Abscess of left lower extremity [L03.116]    Post-Operative Diagnosis: Post-Op Diagnosis Codes:    same    Anesthesia: General    Technical Procedures Used: I&D    Description of the Findings of the Procedure: pus    Significant Surgical Tasks Conducted by the Assistant(s), if Applicable:  Position, prep, drape, retract, dressing application and transfer the patient    Complications: No    Estimated Blood Loss (EBL): 10ml          Implants: * No implants in log * none  Specimens: Cx x 2  Specimen (12h ago, onward)    None                  Condition: Good    Disposition: PACU - hemodynamically stable.    Attestation: I performed the procedure.    Procedure in detail:    After proper consents were obtained patient was taken operating room and administered general anesthesia.  Left lower extremity was then prepped and draped in sterile fashion.  Limb was elevated and tourniquet was inflated.  Incision was made over the fluctuant area and directly through the pustule.  Gross purulence was encountered.  Cultures were taken and sent to the lab.  Incision was extended proximally and distally for a total of about 4-5 cm.  Further purulence was evacuated.  Friable tissue was then debrided with the rongeur and with a curette.  There was some necrotic fat but the fascia appeared to be intact and was not violated.  3 L of normal saline with Betadine were then infiltrated through the wound with the Pulsavac.  Wound was then packed open and sterile dressings were applied.  Tourniquet was deflated.  Patient was aroused operating room and transferred to recovery in stable condition

## 2020-11-19 PROBLEM — L02.416 CELLULITIS AND ABSCESS OF LEFT LEG: Status: ACTIVE | Noted: 2020-11-16

## 2020-11-19 PROBLEM — I47.29 NSVT (NONSUSTAINED VENTRICULAR TACHYCARDIA): Status: ACTIVE | Noted: 2020-11-19

## 2020-11-19 LAB
AORTIC ROOT ANNULUS: 3.27 CM
AORTIC VALVE CUSP SEPERATION: 2.81 CM
ASCENDING AORTA: 2.95 CM
AV INDEX (PROSTH): 0.81
AV MEAN GRADIENT: 4 MMHG
AV PEAK GRADIENT: 7 MMHG
AV VALVE AREA: 4.23 CM2
AV VELOCITY RATIO: 0.85
BSA FOR ECHO PROCEDURE: 2.38 M2
CREAT SERPL-MCNC: 1.2 MG/DL (ref 0.5–1.4)
CV ECHO LV RWT: 0.53 CM
DOP CALC AO PEAK VEL: 1.29 M/S
DOP CALC AO VTI: 27.01 CM
DOP CALC LVOT AREA: 5.2 CM2
DOP CALC LVOT DIAMETER: 2.58 CM
DOP CALC LVOT PEAK VEL: 1.1 M/S
DOP CALC LVOT STROKE VOLUME: 114.12 CM3
DOP CALCLVOT PEAK VEL VTI: 21.84 CM
E WAVE DECELERATION TIME: 242.48 MSEC
E/A RATIO: 2.1
ECHO LV POSTERIOR WALL: 1.39 CM (ref 0.6–1.1)
EST. GFR  (AFRICAN AMERICAN): >60 ML/MIN/1.73 M^2
EST. GFR  (NON AFRICAN AMERICAN): >60 ML/MIN/1.73 M^2
FRACTIONAL SHORTENING: 31 % (ref 28–44)
INTERVENTRICULAR SEPTUM: 1.2 CM (ref 0.6–1.1)
IVRT: 79.92 MSEC
LA MAJOR: 4.84 CM
LA MINOR: 4.57 CM
LA WIDTH: 4.34 CM
LEFT ATRIUM SIZE: 3.2 CM
LEFT ATRIUM VOLUME INDEX: 24 ML/M2
LEFT ATRIUM VOLUME: 55.5 CM3
LEFT INTERNAL DIMENSION IN SYSTOLE: 3.57 CM (ref 2.1–4)
LEFT VENTRICLE DIASTOLIC VOLUME INDEX: 56.05 ML/M2
LEFT VENTRICLE DIASTOLIC VOLUME: 129.75 ML
LEFT VENTRICLE MASS INDEX: 120 G/M2
LEFT VENTRICLE SYSTOLIC VOLUME INDEX: 23 ML/M2
LEFT VENTRICLE SYSTOLIC VOLUME: 53.19 ML
LEFT VENTRICULAR INTERNAL DIMENSION IN DIASTOLE: 5.2 CM (ref 3.5–6)
LEFT VENTRICULAR MASS: 276.92 G
MV PEAK A VEL: 0.48 M/S
MV PEAK E VEL: 1.01 M/S
MV STENOSIS PRESSURE HALF TIME: 70.32 MS
MV VALVE AREA P 1/2 METHOD: 3.13 CM2
PULM VEIN S/D RATIO: 1.7
PV PEAK D VEL: 0.37 M/S
PV PEAK S VEL: 0.63 M/S
PV PEAK VELOCITY: 1.22 CM/S
RA MAJOR: 5.24 CM
RA PRESSURE: 3 MMHG
RA WIDTH: 3.62 CM
RIGHT VENTRICULAR END-DIASTOLIC DIMENSION: 3.34 CM
STJ: 2.89 CM
TRICUSPID ANNULAR PLANE SYSTOLIC EXCURSION: 2.27 CM

## 2020-11-19 PROCEDURE — 82565 ASSAY OF CREATININE: CPT

## 2020-11-19 PROCEDURE — 93005 ELECTROCARDIOGRAM TRACING: CPT

## 2020-11-19 PROCEDURE — 99233 PR SUBSEQUENT HOSPITAL CARE,LEVL III: ICD-10-PCS | Mod: ,,, | Performed by: INTERNAL MEDICINE

## 2020-11-19 PROCEDURE — 63600175 PHARM REV CODE 636 W HCPCS: Performed by: HOSPITALIST

## 2020-11-19 PROCEDURE — 63600175 PHARM REV CODE 636 W HCPCS: Performed by: STUDENT IN AN ORGANIZED HEALTH CARE EDUCATION/TRAINING PROGRAM

## 2020-11-19 PROCEDURE — 63600175 PHARM REV CODE 636 W HCPCS: Performed by: NURSE PRACTITIONER

## 2020-11-19 PROCEDURE — 63600175 PHARM REV CODE 636 W HCPCS: Performed by: INTERNAL MEDICINE

## 2020-11-19 PROCEDURE — 36415 COLL VENOUS BLD VENIPUNCTURE: CPT

## 2020-11-19 PROCEDURE — 25000003 PHARM REV CODE 250: Performed by: INTERNAL MEDICINE

## 2020-11-19 PROCEDURE — 25000003 PHARM REV CODE 250: Performed by: STUDENT IN AN ORGANIZED HEALTH CARE EDUCATION/TRAINING PROGRAM

## 2020-11-19 PROCEDURE — 25000003 PHARM REV CODE 250: Performed by: PHYSICIAN ASSISTANT

## 2020-11-19 PROCEDURE — 99223 PR INITIAL HOSPITAL CARE,LEVL III: ICD-10-PCS | Mod: 25,,, | Performed by: INTERNAL MEDICINE

## 2020-11-19 PROCEDURE — 99223 1ST HOSP IP/OBS HIGH 75: CPT | Mod: 25,,, | Performed by: INTERNAL MEDICINE

## 2020-11-19 PROCEDURE — 93010 ELECTROCARDIOGRAM REPORT: CPT | Mod: ,,, | Performed by: INTERNAL MEDICINE

## 2020-11-19 PROCEDURE — 93010 EKG 12-LEAD: ICD-10-PCS | Mod: ,,, | Performed by: INTERNAL MEDICINE

## 2020-11-19 PROCEDURE — 25000003 PHARM REV CODE 250: Performed by: NURSE PRACTITIONER

## 2020-11-19 PROCEDURE — 11000001 HC ACUTE MED/SURG PRIVATE ROOM

## 2020-11-19 PROCEDURE — 99233 SBSQ HOSP IP/OBS HIGH 50: CPT | Mod: ,,, | Performed by: INTERNAL MEDICINE

## 2020-11-19 RX ORDER — METRONIDAZOLE 500 MG/1
500 TABLET ORAL EVERY 8 HOURS
Status: DISCONTINUED | OUTPATIENT
Start: 2020-11-19 | End: 2020-11-20

## 2020-11-19 RX ORDER — ENOXAPARIN SODIUM 100 MG/ML
40 INJECTION SUBCUTANEOUS EVERY 24 HOURS
Status: DISCONTINUED | OUTPATIENT
Start: 2020-11-19 | End: 2020-11-22 | Stop reason: HOSPADM

## 2020-11-19 RX ADMIN — ACETAMINOPHEN 1000 MG: 500 TABLET ORAL at 11:11

## 2020-11-19 RX ADMIN — VANCOMYCIN HYDROCHLORIDE 2250 MG: 10 INJECTION, POWDER, LYOPHILIZED, FOR SOLUTION INTRAVENOUS at 04:11

## 2020-11-19 RX ADMIN — ENOXAPARIN SODIUM 40 MG: 40 INJECTION SUBCUTANEOUS at 04:11

## 2020-11-19 RX ADMIN — METRONIDAZOLE 500 MG: 500 TABLET ORAL at 09:11

## 2020-11-19 RX ADMIN — HYDROCODONE BITARTRATE AND ACETAMINOPHEN 1 TABLET: 5; 325 TABLET ORAL at 04:11

## 2020-11-19 RX ADMIN — PIPERACILLIN AND TAZOBACTAM 4.5 G: 4; .5 INJECTION, POWDER, LYOPHILIZED, FOR SOLUTION INTRAVENOUS; PARENTERAL at 09:11

## 2020-11-19 RX ADMIN — PIPERACILLIN AND TAZOBACTAM 4.5 G: 4; .5 INJECTION, POWDER, LYOPHILIZED, FOR SOLUTION INTRAVENOUS; PARENTERAL at 01:11

## 2020-11-19 RX ADMIN — SODIUM CHLORIDE: 0.9 INJECTION, SOLUTION INTRAVENOUS at 11:11

## 2020-11-19 RX ADMIN — CEFTRIAXONE 2 G: 2 INJECTION, SOLUTION INTRAVENOUS at 09:11

## 2020-11-19 RX ADMIN — METRONIDAZOLE 500 MG: 500 TABLET ORAL at 01:11

## 2020-11-19 NOTE — PROGRESS NOTES
[facility] Hospital Medicine  Progress Note    Patient Name: Luca Ferraro  MRN: [id number]  Patient Class: IP- Inpatient   Admission Date: 11/16/2020  Length of Stay: 1 days  Attending Physician: Melanie Blanc MD  Primary Care Provider: Primary Doctor No        Subjective:     Principal Problem:Cellulitis and abscess of left leg        HPI:  Luca Ferraro 35 y.o. with tobacco abuse presents to the hospital with a chief complaint of lower extremity swelling. He reports since Friday he has had increasing lower extremity left swelling associated with an intermittent throbbing pain worsened with ambulation without alleviating factors. He has attempted no treatment at home. He has had increasing difficulty ambulating. He believes he was bitten by something that caused the initial swelling. He has been able to express pus and blood from the wound. He has had intermittent fevers at home. He denies fever, chest pain, SOB, N/V, abdominal pain, dizziness, dysuria, syncope. He smokes 1/2ppd he does not drink or use recreational drugs.     In the ED, CT with extensive cellulitis with small fluid seen overlying the thickened superifical fascia of the posterior leg, febrile to 101.8F and WBC of 12.03.     Overview/Hospital Course:  35-year-old  male presented for evaluation of left lower extremity swelling around the area of the catheterization.  Clinical assessment finds an extremely swollen calve much larger in comparison with the other calve.  There is an area that appears to be a puncture wound from some formed insect/crit her that may a bit in the patient with swelling and streaking down toward the ankle.  The area is warm and painful to touch.  There is decreased range of motion in lower extremity and pain upon standing.  Patient was started on dual IV antibiotics overnight and surgery consulted.  CT lower lid with contrast reveals an extensive cellulitis with small fluid  seen overlying the thickening superficial fascia of the posterior lower leg.  Also noted on CT is a suprapatellar effusion. Patient to OR today for I&D 2/2 to minimal improvement with dual ABX    Plan:  Continue dual antibiotics, change Rocephin to zosyn continue Vanc; Await cultures -- temp overnight peak 100.7 with LLE swelling showing minimal improvement - continues to complain of pain on standing and decreased ROM - streaking from calve down back of leg - pain support.  S/P I&D on 11.18.20,culture growing Staph aureus,will follow up with identification and sensetivity.    History reviewed. No pertinent past medical history.    History reviewed. No pertinent surgical history.    Review of patient's allergies indicates:  No Known Allergies    No current facility-administered medications on file prior to encounter.      Current Outpatient Medications on File Prior to Encounter   Medication Sig    oxyCODONE (OXYCONTIN) 10 mg 12 hr tablet Take 10 mg by mouth every 12 (twelve) hours as needed for Pain.     Family History     None        Tobacco Use    Smoking status: Current Every Day Smoker     Packs/day: 1.00     Types: Cigarettes    Smokeless tobacco: Never Used   Substance and Sexual Activity    Alcohol use: Yes    Drug use: Not Currently     Types: Marijuana    Sexual activity: Yes     Review of Systems   Constitutional: Positive for fever. Negative for chills.   HENT: Negative for nosebleeds and tinnitus.    Eyes: Negative for photophobia and visual disturbance.   Respiratory: Negative for shortness of breath and wheezing.    Cardiovascular: Negative for chest pain, palpitations and leg swelling.   Gastrointestinal: Negative for abdominal distention, nausea and vomiting.   Genitourinary: Negative for dysuria, flank pain and hematuria.   Musculoskeletal: Negative for gait problem and joint swelling.   Skin: Positive for color change, rash and wound.   Neurological: Negative for seizures and syncope.      Objective:     Vital Signs (Most Recent):  Temp: 98.4 °F (36.9 °C) (11/19/20 0751)  Pulse: 78 (11/19/20 0751)  Resp: 17 (11/19/20 0751)  BP: (!) 110/57 (11/19/20 0751)  SpO2: 96 % (11/19/20 0751) Vital Signs (24h Range):  Temp:  [97.7 °F (36.5 °C)-99 °F (37.2 °C)] 98.4 °F (36.9 °C)  Pulse:  [76-94] 78  Resp:  [16-20] 17  SpO2:  [96 %-100 %] 96 %  BP: (106-126)/(57-78) 110/57     Weight: 113.3 kg (249 lb 12.5 oz)  Body mass index is 34.84 kg/m².    Physical Exam  Vitals signs and nursing note reviewed.   Constitutional:       General: He is not in acute distress.     Appearance: He is well-developed.   HENT:      Head: Normocephalic and atraumatic.   Eyes:      General:         Right eye: No discharge.         Left eye: No discharge.      Conjunctiva/sclera: Conjunctivae normal.   Neck:      Musculoskeletal: Normal range of motion.      Thyroid: No thyromegaly.   Cardiovascular:      Rate and Rhythm: Normal rate and regular rhythm.      Heart sounds: No murmur.   Pulmonary:      Effort: Pulmonary effort is normal. No respiratory distress.      Breath sounds: Normal breath sounds.   Abdominal:      General: Bowel sounds are normal. There is no distension.      Palpations: Abdomen is soft. There is no mass.      Tenderness: There is no abdominal tenderness.   Musculoskeletal:         General: Swelling, tenderness and signs of injury present. No deformity.      Right lower leg: Edema present.      Comments: Picture placed in chart, cellulitis outlined:  Decrease lower extremity range of motion and pain on standing   Skin:     General: Skin is warm and dry.   Neurological:      Mental Status: He is alert and oriented to person, place, and time.   Psychiatric:         Behavior: Behavior normal.             Significant Labs:   CBC:   Recent Labs   Lab 11/18/20 0946   WBC 7.42   HGB 12.7*   HCT 37.6*        CMP:   Recent Labs   Lab 11/18/20 0946 11/19/20  0544     --    K 4.1  --      --    CO2 26   --    GLU 91  --    BUN 5*  --    CREATININE 0.9 1.2   CALCIUM 8.9  --    PROT 6.4  --    ALBUMIN 2.8*  --    BILITOT 0.4  --    ALKPHOS 51*  --    AST 20  --    ALT 17  --    ANIONGAP 6*  --    EGFRNONAA >60 >60       Significant Imaging:   Imaging Results          CT Leg (Tibia-Fibula) Wtih Contrast Left (Final result)  Result time 11/16/20 19:05:54    Final result by Sharmaine Ramos MD (11/16/20 19:05:54)                 Impression:      Extensive cellulitis, as above described.  Small fluid seen particularly overlying the thickened superficial fascia of the posterior lower leg.  No bony abnormality detected.      Electronically signed by: Sharmaine Ramos  Date:    11/16/2020  Time:    19:05             Narrative:    EXAMINATION:  CT LEG WITH CONTRAST LEFT    CLINICAL HISTORY:  Lower leg swelling/redness, cellulitis suspected;    TECHNIQUE:  1.25 mm unenhanced axial images were obtained through the left lower leg.  Coronal and sagittal reformatted images were provided.    COMPARISON:  None.    FINDINGS:  Extensive reticular pattern in diffuse thickening of the subcutaneous tissue are seen particularly at the lateral and posterior aspect of the lower extremity.  There is thickening of the underlying superficial fascia in the posterior lower leg.  There is a crescent of fluid seen at the posterior aspect of the middle 3rd of the leg overlying the posterior musculature.  There is a tiny suprapatellar effusion.  There is no acute fracture or dislocation.  There is no bony destruction.                                      Assessment/Plan:      * Cellulitis and abscess of left leg  11/18/20: Minimal improvement overnight, continues to complain of pain upon standing and decreased ROM - ortho following - to OR for I&D - follow cultures; change IV ABX to zosyn continue vanc - consult to ID    11/17/2020:  Patient fever I will at the time of presentation with P temp 101.8°.  Started on dual antibiotics CT shows  cellulitis that is consistent with his clinical physical assessment.  Decreased range of motion and pain on standing and to touch extremity warm with streaking.  Continue dual antibiotics awaiting surgery opinion likely home in a.m. if improves    11/16/2020:  With complaints of lower extremity swelling, and a wound that occurred while working outside from a bite. Afebrile without leukocytosis, CT with cellulitis. Sensation intact and distal pulse intact. Picture in chart  -started on rocephin/vanc  -blood cultures pending  -ortho consulted,  S/P I&D on 11.18.20,culture growing Staph aureus,will follow up with identification and sensetivity.    Pain in left lower leg  2/2 cellulitis - he is reluctant to take pain medication due to past history of drug abuse; will order something light as he continues to complain of pain.      Sepsis  He meets criteria for sepsis based on fever to 101.8F, tachycardia to 101.8F, and soruce of infection as antibiotics  -see above  -has been started on antibiotics/fluids with cultures as above,  -    Tobacco abuse  Greater than 3 minutes spent counseling patient on dangers of continued tobacco abuse. Will provide tobacco cessation education prior to discharge.       VTE Risk Mitigation (From admission, onward)         Ordered     enoxaparin injection 40 mg  Every 24 hours      11/19/20 1030     IP VTE HIGH RISK PATIENT  Once      11/16/20 2139     Place sequential compression device  Until discontinued      11/16/20 1952     Place KELVIN hose  Until discontinued      11/16/20 1952                Discharge Planning   GURVINDER:      Code Status: Full Code   Is the patient medically ready for discharge?:     Reason for patient still in hospital (select all that apply): Patient trending condition  Discharge Plan A: Home(with [facility] information)                  Melanie Blanc MD  Department of Hospital Medicine   [facility]

## 2020-11-19 NOTE — PLAN OF CARE
Patient AAOx3, Medication administered per MD orders, patient ambulates to restroom, independently, leg elevated, IV fluids maintained, patient is able to make needs known, through out shift, patinet safety maintained, will continue to monitor.   Problem: Adult Inpatient Plan of Care  Goal: Plan of Care Review  Outcome: Ongoing, Progressing  Goal: Patient-Specific Goal (Individualization)  Outcome: Ongoing, Progressing  Goal: Absence of Hospital-Acquired Illness or Injury  Outcome: Ongoing, Progressing  Goal: Optimal Comfort and Wellbeing  Outcome: Ongoing, Progressing  Goal: Readiness for Transition of Care  Outcome: Ongoing, Progressing  Goal: Rounds/Family Conference  Outcome: Ongoing, Progressing

## 2020-11-19 NOTE — PLAN OF CARE
Problem: Adult Inpatient Plan of Care  Goal: Plan of Care Review  Outcome: Ongoing, Progressing     Problem: Adult Inpatient Plan of Care  Goal: Absence of Hospital-Acquired Illness or Injury  Outcome: Ongoing, Progressing     Problem: Adult Inpatient Plan of Care  Goal: Optimal Comfort and Wellbeing  Outcome: Ongoing, Progressing     Problem: Fall Injury Risk  Goal: Absence of Fall and Fall-Related Injury  Outcome: Ongoing, Progressing     Problem: Pain Acute  Goal: Optimal Pain Control  Outcome: Ongoing, Progressing     Problem: Infection  Goal: Infection Symptom Resolution  Outcome: Ongoing, Progressing     Problem: Infection (Sepsis/Septic Shock)  Goal: Absence of Infection Signs/Symptoms  Outcome: Ongoing, Progressing

## 2020-11-19 NOTE — PLAN OF CARE
Problem: Infection  Goal: Infection Symptom Resolution  Outcome: Ongoing, Progressing  Intervention: Prevent or Manage Infection  Flowsheets (Taken 11/18/2020 2407)  Fever Reduction/Comfort Measures:   lightweight clothing   medication administered  Infection Management: aseptic technique maintained   Patient had I and D completed. Awaiting culture of wound to return that were obtained today. Afebrile. Zosyn added meds regimen. Vancomycin continues

## 2020-11-19 NOTE — NURSING
Patient had a 5 beat run of V-tach, MD notified ordered stat EKG and stat consult to cardiology, MD notified of EKG results. Will continue to monitor.

## 2020-11-19 NOTE — NURSING
Pt resting in bed asleep.  5/10 pain; pain medication available @0140. Safety measures maintained. Will cont to monitor

## 2020-11-19 NOTE — SUBJECTIVE & OBJECTIVE
History reviewed. No pertinent past medical history.    History reviewed. No pertinent surgical history.    Review of patient's allergies indicates:  No Known Allergies    No current facility-administered medications on file prior to encounter.      Current Outpatient Medications on File Prior to Encounter   Medication Sig    oxyCODONE (OXYCONTIN) 10 mg 12 hr tablet Take 10 mg by mouth every 12 (twelve) hours as needed for Pain.     Family History     None        Tobacco Use    Smoking status: Current Every Day Smoker     Packs/day: 1.00     Types: Cigarettes    Smokeless tobacco: Never Used   Substance and Sexual Activity    Alcohol use: Yes    Drug use: Not Currently     Types: Marijuana    Sexual activity: Yes     Review of Systems   Constitutional: Positive for fever. Negative for chills.   HENT: Negative for nosebleeds and tinnitus.    Eyes: Negative for photophobia and visual disturbance.   Respiratory: Negative for shortness of breath and wheezing.    Cardiovascular: Negative for chest pain, palpitations and leg swelling.   Gastrointestinal: Negative for abdominal distention, nausea and vomiting.   Genitourinary: Negative for dysuria, flank pain and hematuria.   Musculoskeletal: Negative for gait problem and joint swelling.   Skin: Positive for color change, rash and wound.   Neurological: Negative for seizures and syncope.     Objective:     Vital Signs (Most Recent):  Temp: 98.4 °F (36.9 °C) (11/19/20 0751)  Pulse: 78 (11/19/20 0751)  Resp: 17 (11/19/20 0751)  BP: (!) 110/57 (11/19/20 0751)  SpO2: 96 % (11/19/20 0751) Vital Signs (24h Range):  Temp:  [97.7 °F (36.5 °C)-99 °F (37.2 °C)] 98.4 °F (36.9 °C)  Pulse:  [76-94] 78  Resp:  [16-20] 17  SpO2:  [96 %-100 %] 96 %  BP: (106-126)/(57-78) 110/57     Weight: 113.3 kg (249 lb 12.5 oz)  Body mass index is 34.84 kg/m².    Physical Exam  Vitals signs and nursing note reviewed.   Constitutional:       General: He is not in acute distress.     Appearance:  He is well-developed.   HENT:      Head: Normocephalic and atraumatic.   Eyes:      General:         Right eye: No discharge.         Left eye: No discharge.      Conjunctiva/sclera: Conjunctivae normal.   Neck:      Musculoskeletal: Normal range of motion.      Thyroid: No thyromegaly.   Cardiovascular:      Rate and Rhythm: Normal rate and regular rhythm.      Heart sounds: No murmur.   Pulmonary:      Effort: Pulmonary effort is normal. No respiratory distress.      Breath sounds: Normal breath sounds.   Abdominal:      General: Bowel sounds are normal. There is no distension.      Palpations: Abdomen is soft. There is no mass.      Tenderness: There is no abdominal tenderness.   Musculoskeletal:         General: Swelling, tenderness and signs of injury present. No deformity.      Right lower leg: Edema present.      Comments: Picture placed in chart, cellulitis outlined:  Decrease lower extremity range of motion and pain on standing   Skin:     General: Skin is warm and dry.   Neurological:      Mental Status: He is alert and oriented to person, place, and time.   Psychiatric:         Behavior: Behavior normal.             Significant Labs:   CBC:   Recent Labs   Lab 11/18/20  0946   WBC 7.42   HGB 12.7*   HCT 37.6*        CMP:   Recent Labs   Lab 11/18/20  0946 11/19/20  0544     --    K 4.1  --      --    CO2 26  --    GLU 91  --    BUN 5*  --    CREATININE 0.9 1.2   CALCIUM 8.9  --    PROT 6.4  --    ALBUMIN 2.8*  --    BILITOT 0.4  --    ALKPHOS 51*  --    AST 20  --    ALT 17  --    ANIONGAP 6*  --    EGFRNONAA >60 >60       Significant Imaging:   Imaging Results          CT Leg (Tibia-Fibula) Wtih Contrast Left (Final result)  Result time 11/16/20 19:05:54    Final result by Sharmaine Ramos MD (11/16/20 19:05:54)                 Impression:      Extensive cellulitis, as above described.  Small fluid seen particularly overlying the thickened superficial fascia of the posterior lower  leg.  No bony abnormality detected.      Electronically signed by: Sharmaine Ramos  Date:    11/16/2020  Time:    19:05             Narrative:    EXAMINATION:  CT LEG WITH CONTRAST LEFT    CLINICAL HISTORY:  Lower leg swelling/redness, cellulitis suspected;    TECHNIQUE:  1.25 mm unenhanced axial images were obtained through the left lower leg.  Coronal and sagittal reformatted images were provided.    COMPARISON:  None.    FINDINGS:  Extensive reticular pattern in diffuse thickening of the subcutaneous tissue are seen particularly at the lateral and posterior aspect of the lower extremity.  There is thickening of the underlying superficial fascia in the posterior lower leg.  There is a crescent of fluid seen at the posterior aspect of the middle 3rd of the leg overlying the posterior musculature.  There is a tiny suprapatellar effusion.  There is no acute fracture or dislocation.  There is no bony destruction.

## 2020-11-19 NOTE — PROGRESS NOTES
POD#1  Patient doing well from the standpoint of his leg. Family at bedside. He had a run of V-Tach this afternoon and is waiting for cardiology to see him.   VSSAF    Left leg: Surgical dressing removed. Packing pulled. No noted re accumulation of pus. No active bleeding. NVI.   Hct: 37.6  WBC: 7.42  Cultures: +Staph aureus    A/P: POD#1 I&D left lower leg  1) packing pulled and leg redressed- ok to leave current dsg on upon d/c  2) stable from ortho standpoint. Will defer to primary team and cardiology on timing of discharge.  3) will need to f/u with Dr Roberson on Wednesday, 11/25

## 2020-11-19 NOTE — HPI
"35M admitted with increasing L calf swelling and pain. Thought he had bite, but didn't see anything bite him. Saw the pimple and tried to pop it. then Noted puss draining from wound. Reports low grade fevers prior to arrival. Came to hospital for increasing pain and swelling. Denies IVDU    S/p I&D with ortho. Per op note "Gross purulence was encountered" .. "There was some necrotic fat but the fascia appeared to be intact and was not violated"    Has been on vanc/ceftriaxone and then ceftriaxone changed to zosyn.    BCx 11/16 - NGTD        Specimen Information: Leg, Left; Wound        Component 1d ago   Aerobic Bacterial Culture Abnormal   STAPHYLOCOCCUS AUREUS   Moderate   Susceptibility pending               ID c/f abx recs      "

## 2020-11-19 NOTE — NURSING
Returned from sx with surgical dressing  intact clean and dry. Good sensation to touch x 4. No complains of pain at time time.

## 2020-11-19 NOTE — PROGRESS NOTES
Vancomycin consult follow-up:    Patient reviewed, renal function stable, no new levels, continue current therapy; Next levels due: trough due 11/20/2020 at 0300

## 2020-11-20 LAB
BACTERIA SPEC AEROBE CULT: ABNORMAL
CREAT SERPL-MCNC: 0.9 MG/DL (ref 0.5–1.4)
EST. GFR  (AFRICAN AMERICAN): >60 ML/MIN/1.73 M^2
EST. GFR  (NON AFRICAN AMERICAN): >60 ML/MIN/1.73 M^2
MAGNESIUM SERPL-MCNC: 2.2 MG/DL (ref 1.6–2.6)
POTASSIUM SERPL-SCNC: 4.8 MMOL/L (ref 3.5–5.1)
VANCOMYCIN TROUGH SERPL-MCNC: 13.4 UG/ML (ref 10–22)

## 2020-11-20 PROCEDURE — 80202 ASSAY OF VANCOMYCIN: CPT

## 2020-11-20 PROCEDURE — 82565 ASSAY OF CREATININE: CPT

## 2020-11-20 PROCEDURE — 25000003 PHARM REV CODE 250: Performed by: INTERNAL MEDICINE

## 2020-11-20 PROCEDURE — 63600175 PHARM REV CODE 636 W HCPCS: Performed by: STUDENT IN AN ORGANIZED HEALTH CARE EDUCATION/TRAINING PROGRAM

## 2020-11-20 PROCEDURE — 63600175 PHARM REV CODE 636 W HCPCS: Performed by: INTERNAL MEDICINE

## 2020-11-20 PROCEDURE — 99233 PR SUBSEQUENT HOSPITAL CARE,LEVL III: ICD-10-PCS | Mod: ,,, | Performed by: INTERNAL MEDICINE

## 2020-11-20 PROCEDURE — 84132 ASSAY OF SERUM POTASSIUM: CPT

## 2020-11-20 PROCEDURE — 63600175 PHARM REV CODE 636 W HCPCS: Performed by: HOSPITALIST

## 2020-11-20 PROCEDURE — 11000001 HC ACUTE MED/SURG PRIVATE ROOM

## 2020-11-20 PROCEDURE — 25000003 PHARM REV CODE 250: Performed by: PHYSICIAN ASSISTANT

## 2020-11-20 PROCEDURE — 36415 COLL VENOUS BLD VENIPUNCTURE: CPT

## 2020-11-20 PROCEDURE — 25000003 PHARM REV CODE 250: Performed by: STUDENT IN AN ORGANIZED HEALTH CARE EDUCATION/TRAINING PROGRAM

## 2020-11-20 PROCEDURE — 99232 SBSQ HOSP IP/OBS MODERATE 35: CPT | Mod: ,,, | Performed by: INTERNAL MEDICINE

## 2020-11-20 PROCEDURE — 83735 ASSAY OF MAGNESIUM: CPT

## 2020-11-20 PROCEDURE — 99233 SBSQ HOSP IP/OBS HIGH 50: CPT | Mod: ,,, | Performed by: INTERNAL MEDICINE

## 2020-11-20 PROCEDURE — 99232 PR SUBSEQUENT HOSPITAL CARE,LEVL II: ICD-10-PCS | Mod: ,,, | Performed by: INTERNAL MEDICINE

## 2020-11-20 RX ADMIN — VANCOMYCIN HYDROCHLORIDE 2250 MG: 10 INJECTION, POWDER, LYOPHILIZED, FOR SOLUTION INTRAVENOUS at 03:11

## 2020-11-20 RX ADMIN — ENOXAPARIN SODIUM 40 MG: 40 INJECTION SUBCUTANEOUS at 05:11

## 2020-11-20 RX ADMIN — SODIUM CHLORIDE: 0.9 INJECTION, SOLUTION INTRAVENOUS at 06:11

## 2020-11-20 RX ADMIN — METRONIDAZOLE 500 MG: 500 TABLET ORAL at 06:11

## 2020-11-20 RX ADMIN — CEFTRIAXONE 2 G: 2 INJECTION, SOLUTION INTRAVENOUS at 10:11

## 2020-11-20 RX ADMIN — METRONIDAZOLE 500 MG: 500 TABLET ORAL at 03:11

## 2020-11-20 NOTE — HPI
Patient is a 35-year-old man.  Came in with lower extremity swelling.  Thinks that something bit him.  Underwent surgery for it.  Was being observed been noted to have few episodes of nonsustained ventricular tachycardia.  Cardiology consult was obtained.  Echocardiogram was performed and personally reviewed.  EKG was performed EKG shows normal sinus rhythm with no acute ischemia.  Echocardiogram shows normal left ventricle systolic function.  Patient denies any chest pains at rest on exertion, orthopnea, PND, swelling of feet.        HPI:  Luca Ferraro 35 y.o. with tobacco abuse presents to the hospital with a chief complaint of lower extremity swelling. He reports since Friday he has had increasing lower extremity left swelling associated with an intermittent throbbing pain worsened with ambulation without alleviating factors. He has attempted no treatment at home. He has had increasing difficulty ambulating. He believes he was bitten by something that caused the initial swelling. He has been able to express pus and blood from the wound. He has had intermittent fevers at home. He denies fever, chest pain, SOB, N/V, abdominal pain, dizziness, dysuria, syncope. He smokes 1/2ppd he does not drink or use recreational drugs.      In the ED, CT with extensive cellulitis with small fluid seen overlying the thickened superifical fascia of the posterior leg, febrile to 101.8F and WBC of 12.03.      Overview/Hospital Course:  35-year-old  male presented for evaluation of left lower extremity swelling around the area of the catheterization.  Clinical assessment finds an extremely swollen calve much larger in comparison with the other calve.  There is an area that appears to be a puncture wound from some formed insect/crit her that may a bit in the patient with swelling and streaking down toward the ankle.  The area is warm and painful to touch.  There is decreased range of motion in lower extremity and  pain upon standing.  Patient was started on dual IV antibiotics overnight and surgery consulted.  CT lower lid with contrast reveals an extensive cellulitis with small fluid seen overlying the thickening superficial fascia of the posterior lower leg.  Also noted on CT is a suprapatellar effusion. Patient to OR today for I&D 2/2 to minimal improvement with dual ABX     Plan:  Continue dual antibiotics, change Rocephin to zosyn continue Vanc; Await cultures -- temp overnight peak 100.7 with LLE swelling showing minimal improvement - continues to complain of pain on standing and decreased ROM - streaking from calve down back of leg - pain support.  S/P I&D on 11.18.20,culture growing Staph aureus,will follow up with identification and sensetivity.

## 2020-11-20 NOTE — PROGRESS NOTES
[facility] Hospital Medicine  Progress Note    Patient Name: Luca Ferraro  MRN: [id number]  Patient Class: IP- Inpatient   Admission Date: 11/16/2020  Length of Stay: 2 days  Attending Physician: Melanie Blanc MD  Primary Care Provider: Primary Doctor No        Subjective:     Principal Problem:Cellulitis and abscess of left leg        HPI:  Luca Ferraro 35 y.o. with tobacco abuse presents to the hospital with a chief complaint of lower extremity swelling. He reports since Friday he has had increasing lower extremity left swelling associated with an intermittent throbbing pain worsened with ambulation without alleviating factors. He has attempted no treatment at home. He has had increasing difficulty ambulating. He believes he was bitten by something that caused the initial swelling. He has been able to express pus and blood from the wound. He has had intermittent fevers at home. He denies fever, chest pain, SOB, N/V, abdominal pain, dizziness, dysuria, syncope. He smokes 1/2ppd he does not drink or use recreational drugs.     In the ED, CT with extensive cellulitis with small fluid seen overlying the thickened superifical fascia of the posterior leg, febrile to 101.8F and WBC of 12.03.     Overview/Hospital Course:  35-year-old  male presented for evaluation of left lower extremity swelling around the area of the catheterization.  Clinical assessment finds an extremely swollen calve much larger in comparison with the other calve.  There is an area that appears to be a puncture wound from some formed insect/crit her that may a bit in the patient with swelling and streaking down toward the ankle.  The area is warm and painful to touch.  There is decreased range of motion in lower extremity and pain upon standing.  Patient was started on dual IV antibiotics overnight and surgery consulted.  CT lower lid with contrast reveals an extensive cellulitis with small fluid  seen overlying the thickening superficial fascia of the posterior lower leg.  Also noted on CT is a suprapatellar effusion. Patient to OR today for I&D 2/2 to minimal improvement with dual ABX    Plan:  Continue dual antibiotics, change Rocephin to zosyn continue Vanc; Await cultures -- temp overnight peak 100.7 with LLE swelling showing minimal improvement - continues to complain of pain on standing and decreased ROM - streaking from calve down back of leg - pain support.  S/P I&D on 11.18.20,culture growing Staph aureus,will follow up with identification and sensetivity.grow MRSA,leg still warm and swollen,will continue with IV Abx.  Had NSVT,yesetrday,resolved,cardiology is following.    History reviewed. No pertinent past medical history.    Past Surgical History:   Procedure Laterality Date    DEBRIDEMENT OF LOWER EXTREMITY Left 11/18/2020    Procedure: DEBRIDEMENT, LOWER EXTREMITY-  LOWER LEG;  Surgeon: Juliocesar Roberson MD;  Location: [facility];  Service: Orthopedics;  Laterality: Left;       Review of patient's allergies indicates:  No Known Allergies    No current facility-administered medications on file prior to encounter.      Current Outpatient Medications on File Prior to Encounter   Medication Sig    oxyCODONE (OXYCONTIN) 10 mg 12 hr tablet Take 10 mg by mouth every 12 (twelve) hours as needed for Pain.     Family History     None        Tobacco Use    Smoking status: Current Every Day Smoker     Packs/day: 1.00     Types: Cigarettes    Smokeless tobacco: Never Used   Substance and Sexual Activity    Alcohol use: Yes    Drug use: Not Currently     Types: Marijuana    Sexual activity: Yes     Review of Systems   Constitutional: Positive for fever. Negative for chills.   HENT: Negative for nosebleeds and tinnitus.    Eyes: Negative for photophobia and visual disturbance.   Respiratory: Negative for shortness of breath and wheezing.    Cardiovascular: Negative for chest pain, palpitations and leg  swelling.   Gastrointestinal: Negative for abdominal distention, nausea and vomiting.   Genitourinary: Negative for dysuria, flank pain and hematuria.   Musculoskeletal: Negative for gait problem and joint swelling.   Skin: Positive for color change, rash and wound.   Neurological: Negative for seizures and syncope.     Objective:     Vital Signs (Most Recent):  Temp: 98.1 °F (36.7 °C) (11/20/20 0742)  Pulse: 71 (11/20/20 0742)  Resp: 17 (11/20/20 0742)  BP: 119/71 (11/20/20 0742)  SpO2: 98 % (11/20/20 0742) Vital Signs (24h Range):  Temp:  [98 °F (36.7 °C)-99.6 °F (37.6 °C)] 98.1 °F (36.7 °C)  Pulse:  [71-91] 71  Resp:  [16-18] 17  SpO2:  [94 %-98 %] 98 %  BP: (108-143)/(55-85) 119/71     Weight: 112.9 kg (249 lb)  Body mass index is 34.73 kg/m².    Physical Exam  Vitals signs and nursing note reviewed.   Constitutional:       General: He is not in acute distress.     Appearance: He is well-developed.   HENT:      Head: Normocephalic and atraumatic.   Eyes:      General:         Right eye: No discharge.         Left eye: No discharge.      Conjunctiva/sclera: Conjunctivae normal.   Neck:      Musculoskeletal: Normal range of motion.      Thyroid: No thyromegaly.   Cardiovascular:      Rate and Rhythm: Normal rate and regular rhythm.      Heart sounds: No murmur.   Pulmonary:      Effort: Pulmonary effort is normal. No respiratory distress.      Breath sounds: Normal breath sounds.   Abdominal:      General: Bowel sounds are normal. There is no distension.      Palpations: Abdomen is soft. There is no mass.      Tenderness: There is no abdominal tenderness.   Musculoskeletal:         General: Swelling, tenderness and signs of injury present. No deformity.      Right lower leg: Edema present.      Comments: Picture placed in chart, cellulitis outlined:  Decrease lower extremity range of motion and pain on standing   Skin:     General: Skin is warm and dry.   Neurological:      Mental Status: He is alert and oriented  to person, place, and time.   Psychiatric:         Behavior: Behavior normal.             Significant Labs:   CBC:   No results for input(s): WBC, HGB, HCT, PLT in the last 48 hours.  CMP:   Recent Labs   Lab 11/19/20  0544 11/20/20  0306   CREATININE 1.2 0.9   EGFRNONAA >60 >60       Significant Imaging:   Imaging Results          CT Leg (Tibia-Fibula) Wtih Contrast Left (Final result)  Result time 11/16/20 19:05:54    Final result by Sharmaine Ramos MD (11/16/20 19:05:54)                 Impression:      Extensive cellulitis, as above described.  Small fluid seen particularly overlying the thickened superficial fascia of the posterior lower leg.  No bony abnormality detected.      Electronically signed by: Sharmaine Ramos  Date:    11/16/2020  Time:    19:05             Narrative:    EXAMINATION:  CT LEG WITH CONTRAST LEFT    CLINICAL HISTORY:  Lower leg swelling/redness, cellulitis suspected;    TECHNIQUE:  1.25 mm unenhanced axial images were obtained through the left lower leg.  Coronal and sagittal reformatted images were provided.    COMPARISON:  None.    FINDINGS:  Extensive reticular pattern in diffuse thickening of the subcutaneous tissue are seen particularly at the lateral and posterior aspect of the lower extremity.  There is thickening of the underlying superficial fascia in the posterior lower leg.  There is a crescent of fluid seen at the posterior aspect of the middle 3rd of the leg overlying the posterior musculature.  There is a tiny suprapatellar effusion.  There is no acute fracture or dislocation.  There is no bony destruction.                                      Assessment/Plan:      * Cellulitis and abscess of left leg  11/18/20: Minimal improvement overnight, continues to complain of pain upon standing and decreased ROM - ortho following - to OR for I&D - follow cultures; change IV ABX to zosyn continue vanc - consult to ID    11/17/2020:  Patient fever I will at the time of  presentation with P temp 101.8°.  Started on dual antibiotics CT shows cellulitis that is consistent with his clinical physical assessment.  Decreased range of motion and pain on standing and to touch extremity warm with streaking.  Continue dual antibiotics awaiting surgery opinion likely home in a.m. if improves    11/16/2020:  With complaints of lower extremity swelling, and a wound that occurred while working outside from a bite. Afebrile without leukocytosis, CT with cellulitis. Sensation intact and distal pulse intact. Picture in chart  -started on rocephin/vanc  -blood cultures pending  -ortho consulted,  S/P I&D on 11.18.20,culture growing Staph aureus,will follow up with identification and sensetivity.grow MRSA,leg still warm and swollen,will continue with IV Abx.    NSVT (nonsustained ventricular tachycardia)  On Tele,resolved,cardiology is following..      Pain in left lower leg  2/2 cellulitis - he is reluctant to take pain medication due to past history of drug abuse; will order something light as he continues to complain of pain.      Sepsis  He meets criteria for sepsis based on fever to 101.8F, tachycardia to 101.8F, and soruce of infection as antibiotics  -see above  -has been started on antibiotics/fluids with cultures as above,  -    Tobacco abuse  Greater than 3 minutes spent counseling patient on dangers of continued tobacco abuse. Will provide tobacco cessation education prior to discharge.       VTE Risk Mitigation (From admission, onward)         Ordered     enoxaparin injection 40 mg  Every 24 hours      11/19/20 1030     IP VTE HIGH RISK PATIENT  Once      11/16/20 2139     Place sequential compression device  Until discontinued      11/16/20 1952     Place KELVIN hose  Until discontinued      11/16/20 1952                Discharge Planning   GURVINDER:      Code Status: Full Code   Is the patient medically ready for discharge?:     Reason for patient still in hospital (select all that apply):  Patient trending condition  Discharge Plan A: Home(with [facility] information)                  Melanie Blanc MD  Department of Hospital Medicine   [facility]

## 2020-11-20 NOTE — PROGRESS NOTES
[facility]  Cardiology  Progress Note    Patient Name: Luca Ferraro  MRN: [id number]  Admission Date: 11/16/2020  Hospital Length of Stay: 2 days  Code Status: Full Code   Attending Physician: Melanie Blanc MD   Primary Care Physician: Primary Doctor No  Expected Discharge Date:   Principal Problem:Cellulitis and abscess of left leg    Subjective:     Hospital Course:   No notes on file        Review of Systems   Cardiovascular: Positive for irregular heartbeat and leg swelling. Negative for chest pain, dyspnea on exertion, near-syncope, orthopnea, palpitations and syncope.   Respiratory: Negative for shortness of breath, sputum production and wheezing.      Objective:     Vital Signs (Most Recent):  Temp: 98.1 °F (36.7 °C) (11/20/20 0742)  Pulse: 71 (11/20/20 0742)  Resp: 17 (11/20/20 0742)  BP: 119/71 (11/20/20 0742)  SpO2: 98 % (11/20/20 0742) Vital Signs (24h Range):  Temp:  [98 °F (36.7 °C)-99.6 °F (37.6 °C)] 98.1 °F (36.7 °C)  Pulse:  [71-91] 71  Resp:  [16-18] 17  SpO2:  [94 %-98 %] 98 %  BP: (108-143)/(55-85) 119/71     Weight: 112.9 kg (249 lb)  Body mass index is 34.73 kg/m².     SpO2: 98 %  O2 Device (Oxygen Therapy): room air      Intake/Output Summary (Last 24 hours) at 11/20/2020 0853  Last data filed at 11/20/2020 0615  Gross per 24 hour   Intake 3286 ml   Output 2401 ml   Net 885 ml       Lines/Drains/Airways     Peripheral Intravenous Line                 Peripheral IV - Single Lumen 11/16/20 1608 20 G Right Antecubital 3 days         Peripheral IV - Single Lumen 11/18/20 1250 20 G Left Forearm 1 day                Physical Exam   Constitutional: He is oriented to person, place, and time. He appears well-developed and well-nourished.   HENT:   Head: Normocephalic.   Eyes: Pupils are equal, round, and reactive to light. Conjunctivae are normal.   Neck: Normal range of motion. Neck supple.   Cardiovascular: Normal rate, regular rhythm and normal heart sounds.    Pulmonary/Chest: Effort normal and breath sounds normal.   Abdominal: Soft. Bowel sounds are normal.   Musculoskeletal:      Left lower leg: Edema present.   Neurological: He is alert and oriented to person, place, and time.   Skin: Skin is warm.   Vitals reviewed.      Significant Labs:   BMP:   Recent Labs   Lab 11/18/20  0946 11/19/20  0544 11/20/20  0306   GLU 91  --   --      --   --    K 4.1  --   --      --   --    CO2 26  --   --    BUN 5*  --   --    CREATININE 0.9 1.2 0.9   CALCIUM 8.9  --   --    MG 2.1  --   --    , CBC   Recent Labs   Lab 11/18/20  0946   WBC 7.42   HGB 12.7*   HCT 37.6*      , Lipid Panel No results for input(s): CHOL, HDL, LDLCALC, TRIG, CHOLHDL in the last 48 hours. and Troponin No results for input(s): TROPONINI in the last 48 hours.    Significant Imaging: NSVT noted.  Other telemetry abnormalities appear to be artifactual.    Assessment and Plan:     Brief HPI:     * Cellulitis and abscess of left leg        NSVT (nonsustained ventricular tachycardia)   Echo did not show any significant structural abnormality.  Brief episode of NSVT in the setting of sepsis.  Keep electrolytes balanced.  Potassium around 4 and magnesium around 2.  Continue to monitor on tele monitoring.  Further workup to be determined by  clinical course.    11/20/2020-clinically stable.  Echocardiogram reviewed.  Monitor.    Sepsis        Tobacco abuse            VTE Risk Mitigation (From admission, onward)         Ordered     enoxaparin injection 40 mg  Every 24 hours      11/19/20 1030     IP VTE HIGH RISK PATIENT  Once      11/16/20 2139     Place sequential compression device  Until discontinued      11/16/20 1952     Place KELVIN hose  Until discontinued      11/16/20 1952              Will sign off for now.  Appreciate consultation.  Please call if re-consult needed.      Stanley Martinez MD  Cardiology  [facility]

## 2020-11-20 NOTE — CONSULTS
CM referral received to arrange a follow up with Dr Orozco in clinic; appt as follows:    Follow-up Information     [facility]    Why: call at time of discharge to schedule post hospital discharge with PCP as needed  Contact information:  [address] [facility] [address]  [phone]             Jaswinder Orozco MD On 12/7/2020.    Specialties: Cardiology, INTERVENTIONAL CARDIOLOGY  Why: Hospital discharge follow up, appointment scheduled for 0900 at the [facility]  Contact information:  [address]  [address]  [facility] [address]  [phone]             Juliocesar Roberson MD On 11/25/2020.    Specialty: Orthopedic Surgery  Why: Hospital discharge follow up, appointment scheduled for 09:30  Contact information:  [address] [facility]  [address]  [facility] [address]  [phone]

## 2020-11-20 NOTE — PLAN OF CARE
Patient admitted from home with abscess to left leg; ortho consulted and performed I&D; cultures returned with MRSA: ID consulted and following; plan to convert to po abx upon discharge; pt experienced 1 bout of NSVT; cardiology was consulted and following    TN in to update patient on discharge plan; denies discharge needs at this time    DC Plan: home with po abx; pt will be referred to [facility] upon discharge; Cardio/Orth appts made; f/u tab updated       11/20/20 1300   Discharge Reassessment   Assessment Type Discharge Planning Reassessment   Provided patient/caregiver education on the expected discharge date and the discharge plan Yes   Do you have any problems affording any of your prescribed medications? No   Discharge Plan A Home   DME Needed Upon Discharge    (TBD)   Patient choice form signed by patient/caregiver N/A   Anticipated Discharge Disposition Home   Can the patient/caregiver answer the patient profile reliably? Yes, cognitively intact   How does the patient rate their overall health at the present time? Excellent   Describe the patient's ability to walk at the present time. No restrictions   How often would a person be available to care for the patient? Whenever needed   Number of comorbid conditions (as recorded on the chart) None   During the past month, has the patient often been bothered by feeling down, depressed or hopeless? No   During the past month, has the patient often been bothered by little interest or pleasure in doing things? No   Post-Acute Status   Post-Acute Authorization Other   Other Status No Post-Acute Service Needs   Discharge Delays None known at this time

## 2020-11-20 NOTE — PLAN OF CARE
Problem: Adult Inpatient Plan of Care  Goal: Plan of Care Review  Outcome: Ongoing, Progressing     Problem: Adult Inpatient Plan of Care  Goal: Absence of Hospital-Acquired Illness or Injury  Outcome: Ongoing, Progressing     Problem: Adult Inpatient Plan of Care  Goal: Readiness for Transition of Care  Outcome: Ongoing, Progressing     Problem: Bleeding (Sepsis/Septic Shock)  Goal: Absence of Bleeding  Outcome: Ongoing, Progressing     Problem: Infection (Sepsis/Septic Shock)  Goal: Absence of Infection Signs/Symptoms  Outcome: Ongoing, Progressing     Problem: Fall Injury Risk  Goal: Absence of Fall and Fall-Related Injury  Outcome: Ongoing, Progressing     Problem: Infection  Goal: Infection Symptom Resolution  Outcome: Ongoing, Progressing

## 2020-11-20 NOTE — PROGRESS NOTES
Pharmacokinetic Assessment Follow Up: IV Vancomycin    Vancomycin serum concentration assessment(s):    The trough level was drawn correctly and can be used to guide therapy at this time. The measurement is within the desired definitive target range of 10 to 20 mcg/mL.    Vancomycin Regimen Plan:    Continue regimen to Vancomycin 2250 mg IV every 12 hours with next serum trough concentration measured at 03:00 prior to fourth dose on 11/22/20    Drug levels (last 3 results):  Recent Labs   Lab Result Units 11/18/20  0247 11/20/20  0307   Vancomycin-Trough ug/mL 8.7* 13.4       Pharmacy will continue to follow and monitor vancomycin.    Please contact pharmacy at extension [phone] for questions regarding this assessment.    Thank you for the consult,   Raysa Sampson       Patient brief summary:  Luca Ferraro is a 35 y.o. male initiated on antimicrobial therapy with IV Vancomycin for treatment of skin & soft tissue infection    The patient's current regimen is Vancomycin 2250 mg IV q12h    Drug Allergies:   Review of patient's allergies indicates:  No Known Allergies    Actual Body Weight:   112.9 kg    Renal Function:   Estimated Creatinine Clearance: 147.7 mL/min (based on SCr of 0.9 mg/dL).,     Dialysis Method (if applicable):  N/A    CBC (last 72 hours):  Recent Labs   Lab Result Units 11/18/20  0946   WBC K/uL 7.42   Hemoglobin g/dL 12.7*   Hematocrit % 37.6*   Platelets K/uL 240   Gran % % 63.1   Lymph % % 18.5   Mono % % 15.9*   Eosinophil % % 1.8   Basophil % % 0.3   Differential Method  Automated       Metabolic Panel (last 72 hours):  Recent Labs   Lab Result Units 11/17/20  1840 11/18/20  0946 11/19/20  0544 11/20/20  0306   Sodium mmol/L  --  138  --   --    Potassium mmol/L  --  4.1  --   --    Chloride mmol/L  --  106  --   --    CO2 mmol/L  --  26  --   --    Glucose mg/dL  --  91  --   --    Glucose, UA  Negative  --   --   --    BUN mg/dL  --  5*  --   --    Creatinine mg/dL  --  0.9 1.2 0.9    Albumin g/dL  --  2.8*  --   --    Total Bilirubin mg/dL  --  0.4  --   --    Alkaline Phosphatase U/L  --  51*  --   --    AST U/L  --  20  --   --    ALT U/L  --  17  --   --    Magnesium mg/dL  --  2.1  --   --    Phosphorus mg/dL  --  3.7  --   --        Vancomycin Administrations:  vancomycin given in the last 96 hours                     vancomycin (VANCOCIN) 2,250 mg in dextrose 5 % 500 mL IVPB (mg) 2,250 mg New Bag 11/20/20 0350     2,250 mg New Bag 11/19/20 1655     2,250 mg New Bag  0440     2,250 mg New Bag 11/18/20 1518    vancomycin (VANCOCIN) 2,000 mg in dextrose 5 % 500 mL IVPB (mg) 2,000 mg New Bag 11/18/20 0337     2,000 mg New Bag 11/17/20 1505     2,000 mg New Bag  0418    vancomycin (VANCOCIN) 2,000 mg in dextrose 5 % 500 mL IVPB (mg) 2,000 mg New Bag 11/16/20 1612                    Microbiologic Results:  Microbiology Results (last 7 days)       Procedure Component Value Units Date/Time    Blood culture #1 **CANNOT BE ORDERED STAT** [[id number]] Collected: 11/16/20 1612    Order Status: Completed Specimen: Blood from Peripheral, Antecubital, Right Updated: 11/19/20 1703     Blood Culture, Routine No Growth to date      No Growth to date      No Growth to date      No Growth to date    Blood culture #2 **CANNOT BE ORDERED STAT** [[id number]] Collected: 11/16/20 1606    Order Status: Completed Specimen: Blood from Peripheral, Antecubital, Left Updated: 11/19/20 1703     Blood Culture, Routine No Growth to date      No Growth to date      No Growth to date      No Growth to date    Aerobic culture [[id number]]  (Abnormal) Collected: 11/18/20 1301    Order Status: Completed Specimen: Wound from Leg, Left Updated: 11/19/20 0854     Aerobic Bacterial Culture STAPHYLOCOCCUS AUREUS  Moderate  Susceptibility pending      Gram stain [[id number]] Collected: 11/18/20 1301    Order Status: Completed Specimen: Wound from Leg, Left Updated: 11/18/20 1416     Gram Stain Result Moderate WBC's      Rare Gram  positive cocci    Culture, Anaerobe [[id number]] Collected: 11/18/20 1300    Order Status: Sent Specimen: Wound from Leg, Left Updated: 11/18/20 6429

## 2020-11-20 NOTE — CONSULTS
[facility]  Cardiology  Consult Note    Patient Name: Luca Ferraro  MRN: [id number]  Admission Date: 11/16/2020  Hospital Length of Stay: 1 days  Code Status: Full Code   Attending Provider: Melanie Blanc MD   Consulting Provider: Jaswinder Orozco MD  Primary Care Physician: Primary Doctor No  Principal Problem:Cellulitis and abscess of left leg    Patient information was obtained from patient and ER records.     Inpatient consult to Cardiology  Consult performed by: Jaswinder Orozco MD  Consult ordered by: Melanie Blanc MD        Subjective:     Chief Complaint:  nsvt     HPI:   Patient is a 35-year-old man.  Came in with lower extremity swelling.  Thinks that something bit him.  Underwent surgery for it.  Was being observed been noted to have few episodes of nonsustained ventricular tachycardia.  Cardiology consult was obtained.  Echocardiogram was performed and personally reviewed.  EKG was performed EKG shows normal sinus rhythm with no acute ischemia.  Echocardiogram shows normal left ventricle systolic function.  Patient denies any chest pains at rest on exertion, orthopnea, PND, swelling of feet.        HPI:  Luca Ferraro 35 y.o. with tobacco abuse presents to the hospital with a chief complaint of lower extremity swelling. He reports since Friday he has had increasing lower extremity left swelling associated with an intermittent throbbing pain worsened with ambulation without alleviating factors. He has attempted no treatment at home. He has had increasing difficulty ambulating. He believes he was bitten by something that caused the initial swelling. He has been able to express pus and blood from the wound. He has had intermittent fevers at home. He denies fever, chest pain, SOB, N/V, abdominal pain, dizziness, dysuria, syncope. He smokes 1/2ppd he does not drink or use recreational drugs.      In the ED, CT with extensive cellulitis with small fluid seen overlying  the thickened superifical fascia of the posterior leg, febrile to 101.8F and WBC of 12.03.      Overview/Hospital Course:  35-year-old  male presented for evaluation of left lower extremity swelling around the area of the catheterization.  Clinical assessment finds an extremely swollen calve much larger in comparison with the other calve.  There is an area that appears to be a puncture wound from some formed insect/crit her that may a bit in the patient with swelling and streaking down toward the ankle.  The area is warm and painful to touch.  There is decreased range of motion in lower extremity and pain upon standing.  Patient was started on dual IV antibiotics overnight and surgery consulted.  CT lower lid with contrast reveals an extensive cellulitis with small fluid seen overlying the thickening superficial fascia of the posterior lower leg.  Also noted on CT is a suprapatellar effusion. Patient to OR today for I&D 2/2 to minimal improvement with dual ABX     Plan:  Continue dual antibiotics, change Rocephin to zosyn continue Vanc; Await cultures -- temp overnight peak 100.7 with LLE swelling showing minimal improvement - continues to complain of pain on standing and decreased ROM - streaking from calve down back of leg - pain support.  S/P I&D on 11.18.20,culture growing Staph aureus,will follow up with identification and sensetivity.    History reviewed. No pertinent past medical history.    Past Surgical History:   Procedure Laterality Date    DEBRIDEMENT OF LOWER EXTREMITY Left 11/18/2020    Procedure: DEBRIDEMENT, LOWER EXTREMITY-  LOWER LEG;  Surgeon: Juliocesar Roberson MD;  Location: [facility];  Service: Orthopedics;  Laterality: Left;       Review of patient's allergies indicates:  No Known Allergies    No current facility-administered medications on file prior to encounter.      Current Outpatient Medications on File Prior to Encounter   Medication Sig    oxyCODONE (OXYCONTIN) 10 mg 12 hr  tablet Take 10 mg by mouth every 12 (twelve) hours as needed for Pain.     Family History     None        Tobacco Use    Smoking status: Current Every Day Smoker     Packs/day: 1.00     Types: Cigarettes    Smokeless tobacco: Never Used   Substance and Sexual Activity    Alcohol use: Yes    Drug use: Not Currently     Types: Marijuana    Sexual activity: Yes     Review of Systems   Constitution: Negative.   HENT: Negative.    Eyes: Negative.    Cardiovascular: Positive for leg swelling.   Respiratory: Negative.    Endocrine: Negative.    Hematologic/Lymphatic: Negative.    Skin: Negative.    Musculoskeletal: Negative.    Gastrointestinal: Negative.    Genitourinary: Negative.    Neurological: Negative.    Psychiatric/Behavioral: Negative.    Allergic/Immunologic: Negative.      Objective:     Vital Signs (Most Recent):  Temp: 99 °F (37.2 °C) (11/19/20 1629)  Pulse: 91 (11/19/20 1629)  Resp: 18 (11/19/20 1629)  BP: (!) 143/85 (11/19/20 1629)  SpO2: 98 % (11/19/20 1629) Vital Signs (24h Range):  Temp:  [98 °F (36.7 °C)-99 °F (37.2 °C)] 99 °F (37.2 °C)  Pulse:  [78-94] 91  Resp:  [17-20] 18  SpO2:  [94 %-98 %] 98 %  BP: (106-143)/(57-85) 143/85     Weight: 112.9 kg (249 lb)  Body mass index is 34.73 kg/m².    SpO2: 98 %  O2 Device (Oxygen Therapy): room air      Intake/Output Summary (Last 24 hours) at 11/19/2020 1814  Last data filed at 11/19/2020 1300  Gross per 24 hour   Intake 820 ml   Output 1825 ml   Net -1005 ml       Lines/Drains/Airways     Peripheral Intravenous Line                 Peripheral IV - Single Lumen 11/16/20 1608 20 G Right Antecubital 3 days         Peripheral IV - Single Lumen 11/18/20 1250 20 G Left Forearm 1 day                Physical Exam   Constitutional: He is oriented to person, place, and time. He appears well-developed and well-nourished.   HENT:   Head: Normocephalic.   Eyes: Pupils are equal, round, and reactive to light. Conjunctivae are normal.   Neck: Normal range of motion.  Neck supple.   Cardiovascular: Normal rate, regular rhythm and normal heart sounds.   Pulmonary/Chest: Effort normal and breath sounds normal.   Abdominal: Soft. Bowel sounds are normal.   Neurological: He is alert and oriented to person, place, and time.   Skin: Skin is warm.   Vitals reviewed.      Significant Labs:   BMP:   Recent Labs   Lab 11/18/20  0946 11/19/20  0544   GLU 91  --      --    K 4.1  --      --    CO2 26  --    BUN 5*  --    CREATININE 0.9 1.2   CALCIUM 8.9  --    MG 2.1  --    , CMP   Recent Labs   Lab 11/18/20  0946 11/19/20  0544     --    K 4.1  --      --    CO2 26  --    GLU 91  --    BUN 5*  --    CREATININE 0.9 1.2   CALCIUM 8.9  --    PROT 6.4  --    ALBUMIN 2.8*  --    BILITOT 0.4  --    ALKPHOS 51*  --    AST 20  --    ALT 17  --    ANIONGAP 6*  --    ESTGFRAFRICA >60 >60   EGFRNONAA >60 >60   , CBC   Recent Labs   Lab 11/18/20  0946   WBC 7.42   HGB 12.7*   HCT 37.6*      , INR No results for input(s): INR, PROTIME in the last 48 hours., Lipid Panel No results for input(s): CHOL, HDL, LDLCALC, TRIG, CHOLHDL in the last 48 hours., Troponin No results for input(s): TROPONINI in the last 48 hours. and All pertinent lab results from the last 24 hours have been reviewed.    Significant Imaging: Echocardiogram:   Transthoracic echo (TTE) complete (Cupid Only):   Results for orders placed or performed during the hospital encounter of 11/16/20   Echo Color Flow Doppler? Yes   Result Value Ref Range    BSA 2.38 m2    LA WIDTH 4.34 cm    AORTIC VALVE CUSP SEPERATION 2.81 cm    PV PEAK VELOCITY 1.22 cm/s    LVIDd 5.20 3.5 - 6.0 cm    IVS 1.20 (A) 0.6 - 1.1 cm    Posterior Wall 1.39 (A) 0.6 - 1.1 cm    Ao root annulus 3.27 cm    LVIDs 3.57 2.1 - 4.0 cm    FS 31 28 - 44 %    LA volume 55.50 cm3    STJ 2.89 cm    Ascending aorta 2.95 cm    LV mass 276.92 g    LA size 3.20 cm    RVDD 3.34 cm    TAPSE 2.27 cm    Left Ventricle Relative Wall Thickness 0.53 cm    AV mean  gradient 4 mmHg    AV valve area 4.23 cm2    AV Velocity Ratio 0.85     AV index (prosthetic) 0.81     MV valve area p 1/2 method 3.13 cm2    E/A ratio 2.10     E wave decelartion time 242.48 msec    IVRT 79.92 msec    Pulm vein S/D ratio 1.70     LVOT diameter 2.58 cm    LVOT area 5.2 cm2    LVOT peak dago 1.10 m/s    LVOT peak VTI 21.84 cm    Ao peak dago 1.29 m/s    Ao VTI 27.01 cm    LVOT stroke volume 114.12 cm3    AV peak gradient 7 mmHg    MV Peak E Dago 1.01 m/s    MV stenosis pressure 1/2 time 70.32 ms    MV Peak A Dago 0.48 m/s    PV Peak S Dago 0.63 m/s    PV Peak D Dago 0.37 m/s    LV Systolic Volume 53.19 mL    LV Systolic Volume Index 23.0 mL/m2    LV Diastolic Volume 129.75 mL    LV Diastolic Volume Index 56.05 mL/m2    LA Volume Index 24.0 mL/m2    LV Mass Index 120 g/m2    RA Major Axis 5.24 cm    Left Atrium Minor Axis 4.57 cm    Left Atrium Major Axis 4.84 cm    RA Width 3.62 cm    Right Atrial Pressure (from IVC) 3 mmHg    Narrative    · The left ventricle is normal in size with normal systolic function. The   estimated ejection fraction is 60%.  · There is mild left ventricular concentric hypertrophy.  · Normal left ventricular diastolic function.  · Normal right ventricular size.  · Mild left atrial enlargement.  · Normal central venous pressure (3 mmHg).        Assessment and Plan:     * Cellulitis and abscess of left leg        NSVT (nonsustained ventricular tachycardia)   Echo did not show any significant structural abnormality.  Brief episode of NSVT in the setting of sepsis.  Keep electrolytes balanced.  Potassium around 4 and magnesium around 2.  Continue to monitor on tele monitoring.  Further workup to be determined by  clinical course.    Sepsis        Tobacco abuse            VTE Risk Mitigation (From admission, onward)         Ordered     enoxaparin injection 40 mg  Every 24 hours      11/19/20 1030     IP VTE HIGH RISK PATIENT  Once      11/16/20 2139     Place sequential compression  device  Until discontinued      11/16/20 1952     Place KELVIN hose  Until discontinued      11/16/20 1952                Thank you for your consult. I will follow-up with patient. Please contact us if you have any additional questions.    Jaswinder Orozco MD  Cardiology   [facility]

## 2020-11-20 NOTE — CONSULTS
"[facility]  Infectious Disease  Consult Note    Patient Name: Luca Ferraro  MRN: [id number]  Admission Date: 11/16/2020  Hospital Length of Stay: 2 days  Attending Physician: Melanie Blanc MD  Primary Care Provider: Primary Doctor No     Isolation Status: Contact    Patient information was obtained from patient and ER records.      Consults  Assessment/Plan:     * Cellulitis and abscess of left leg  35M admitted with leg abscess and fever. S/p I&D with Gross purulence noted. infection noted to be superior to fascia. Operative cultures with MRSA. BCx 11/16 - NGTD.  ID c/f abx recs    Recommendations:   - continue vancomycin while inpatient. Can stop ceftriaxone/metronidazole. When patient ready for discharge, de-escalate to PO doxycycline 100mg BID x 14 days (est end date 12/2/20). Counseled on side effects.   - please notify ID with any new growth on cultures.             Thank you for your consult. I will sign off. Please contact us if you have any additional questions.    Sophia Hewitt MD  Infectious Disease  [facility]    Subjective:     Principal Problem: Cellulitis and abscess of left leg    HPI:   35M admitted with increasing L calf swelling and pain. Thought he had bite, but didn't see anything bite him. Saw the pimple and tried to pop it. then Noted puss draining from wound. Reports low grade fevers prior to arrival. Came to hospital for increasing pain and swelling. Denies IVDU    S/p I&D with ortho. Per op note "Gross purulence was encountered" .. "There was some necrotic fat but the fascia appeared to be intact and was not violated"    Has been on vanc/ceftriaxone and then ceftriaxone changed to zosyn.    BCx 11/16 - NGTD        Specimen Information: Leg, Left; Wound        Component 1d ago   Aerobic Bacterial Culture Abnormal   STAPHYLOCOCCUS AUREUS   Moderate   Susceptibility pending               ID c/f abx recs        Interval history: NAEO. Packing " removed. Wound cultures growing MRSA. bcx still clear. Reports improving pain in leg. Denies other complaints.     History reviewed. No pertinent past medical history.      Past Surgical History:   Procedure Laterality Date    DEBRIDEMENT OF LOWER EXTREMITY Left 11/18/2020    Procedure: DEBRIDEMENT, LOWER EXTREMITY-  LOWER LEG;  Surgeon: Juliocesar Roberson MD;  Location: [facility];  Service: Orthopedics;  Laterality: Left;       Review of patient's allergies indicates:  No Known Allergies    No current facility-administered medications on file prior to encounter.      Current Outpatient Medications on File Prior to Encounter   Medication Sig    oxyCODONE (OXYCONTIN) 10 mg 12 hr tablet Take 10 mg by mouth every 12 (twelve) hours as needed for Pain.     Family History     None        Tobacco Use    Smoking status: Current Every Day Smoker     Packs/day: 1.00     Types: Cigarettes    Smokeless tobacco: Never Used   Substance and Sexual Activity    Alcohol use: Yes    Drug use: Not Currently     Types: Marijuana    Sexual activity: Yes     Review of Systems   Constitutional: Positive for fever. Negative for chills.   HENT: Negative for nosebleeds and tinnitus.    Eyes: Negative for photophobia and visual disturbance.   Respiratory: Negative for shortness of breath and wheezing.    Cardiovascular: Negative for chest pain, palpitations and leg swelling.   Gastrointestinal: Negative for abdominal distention, nausea and vomiting.   Genitourinary: Negative for dysuria, flank pain and hematuria.   Musculoskeletal: Negative for gait problem and joint swelling.   Skin: Positive for color change, rash and wound.   Neurological: Negative for seizures and syncope.     Objective:     Vital Signs (Most Recent):  Temp: 98.2 °F (36.8 °C) (11/20/20 1124)  Pulse: 74 (11/20/20 1124)  Resp: 17 (11/20/20 1124)  BP: 110/65 (11/20/20 1124)  SpO2: 99 % (11/20/20 1124) Vital Signs (24h Range):  Temp:  [98.1 °F (36.7 °C)-99.6 °F (37.6 °C)]  98.2 °F (36.8 °C)  Pulse:  [71-91] 74  Resp:  [16-18] 17  SpO2:  [98 %-99 %] 99 %  BP: (109-143)/(55-85) 110/65     Weight: 112.9 kg (249 lb)  Body mass index is 34.73 kg/m².    Physical Exam  Vitals signs and nursing note reviewed.   Constitutional:       General: He is not in acute distress.     Appearance: He is well-developed.   HENT:      Head: Normocephalic and atraumatic.   Eyes:      General:         Right eye: No discharge.         Left eye: No discharge.      Conjunctiva/sclera: Conjunctivae normal.   Neck:      Musculoskeletal: Normal range of motion.      Thyroid: No thyromegaly.   Cardiovascular:      Rate and Rhythm: Normal rate and regular rhythm.      Heart sounds: No murmur.   Pulmonary:      Effort: Pulmonary effort is normal. No respiratory distress.      Breath sounds: Normal breath sounds.   Abdominal:      General: Bowel sounds are normal. There is no distension.      Palpations: Abdomen is soft. There is no mass.      Tenderness: There is no abdominal tenderness.   Musculoskeletal:         General: Swelling, tenderness and signs of injury present. No deformity.      Right lower leg: Edema present.      Comments: Post op dressings c/d/i. See photo   Skin:     General: Skin is warm and dry.   Neurological:      Mental Status: He is alert and oriented to person, place, and time.   Psychiatric:         Behavior: Behavior normal.                 Significant Labs:   CBC:   No results for input(s): WBC, HGB, HCT, PLT in the last 48 hours.  CMP:   Recent Labs   Lab 11/19/20  0544 11/20/20  0306 11/20/20  1104   K  --   --  4.8   CREATININE 1.2 0.9  --    EGFRNONAA >60 >60  --        Significant Imaging:   Imaging Results          CT Leg (Tibia-Fibula) Wtih Contrast Left (Final result)  Result time 11/16/20 19:05:54    Final result by Sharmaine Ramos MD (11/16/20 19:05:54)                 Impression:      Extensive cellulitis, as above described.  Small fluid seen particularly overlying the  thickened superficial fascia of the posterior lower leg.  No bony abnormality detected.      Electronically signed by: Sharmaine Ramos  Date:    11/16/2020  Time:    19:05             Narrative:    EXAMINATION:  CT LEG WITH CONTRAST LEFT    CLINICAL HISTORY:  Lower leg swelling/redness, cellulitis suspected;    TECHNIQUE:  1.25 mm unenhanced axial images were obtained through the left lower leg.  Coronal and sagittal reformatted images were provided.    COMPARISON:  None.    FINDINGS:  Extensive reticular pattern in diffuse thickening of the subcutaneous tissue are seen particularly at the lateral and posterior aspect of the lower extremity.  There is thickening of the underlying superficial fascia in the posterior lower leg.  There is a crescent of fluid seen at the posterior aspect of the middle 3rd of the leg overlying the posterior musculature.  There is a tiny suprapatellar effusion.  There is no acute fracture or dislocation.  There is no bony destruction.

## 2020-11-20 NOTE — SUBJECTIVE & OBJECTIVE
History reviewed. No pertinent past medical history.    Past Surgical History:   Procedure Laterality Date    DEBRIDEMENT OF LOWER EXTREMITY Left 11/18/2020    Procedure: DEBRIDEMENT, LOWER EXTREMITY-  LOWER LEG;  Surgeon: Juliocesar Roberson MD;  Location: [facility] OR;  Service: Orthopedics;  Laterality: Left;       Review of patient's allergies indicates:  No Known Allergies    No current facility-administered medications on file prior to encounter.      Current Outpatient Medications on File Prior to Encounter   Medication Sig    oxyCODONE (OXYCONTIN) 10 mg 12 hr tablet Take 10 mg by mouth every 12 (twelve) hours as needed for Pain.     Family History     None        Tobacco Use    Smoking status: Current Every Day Smoker     Packs/day: 1.00     Types: Cigarettes    Smokeless tobacco: Never Used   Substance and Sexual Activity    Alcohol use: Yes    Drug use: Not Currently     Types: Marijuana    Sexual activity: Yes     Review of Systems   Constitutional: Positive for fever. Negative for chills.   HENT: Negative for nosebleeds and tinnitus.    Eyes: Negative for photophobia and visual disturbance.   Respiratory: Negative for shortness of breath and wheezing.    Cardiovascular: Negative for chest pain, palpitations and leg swelling.   Gastrointestinal: Negative for abdominal distention, nausea and vomiting.   Genitourinary: Negative for dysuria, flank pain and hematuria.   Musculoskeletal: Negative for gait problem and joint swelling.   Skin: Positive for color change, rash and wound.   Neurological: Negative for seizures and syncope.     Objective:     Vital Signs (Most Recent):  Temp: 98.1 °F (36.7 °C) (11/20/20 0742)  Pulse: 71 (11/20/20 0742)  Resp: 17 (11/20/20 0742)  BP: 119/71 (11/20/20 0742)  SpO2: 98 % (11/20/20 0742) Vital Signs (24h Range):  Temp:  [98 °F (36.7 °C)-99.6 °F (37.6 °C)] 98.1 °F (36.7 °C)  Pulse:  [71-91] 71  Resp:  [16-18] 17  SpO2:  [94 %-98 %] 98 %  BP: (108-143)/(55-85) 119/71      Weight: 112.9 kg (249 lb)  Body mass index is 34.73 kg/m².    Physical Exam  Vitals signs and nursing note reviewed.   Constitutional:       General: He is not in acute distress.     Appearance: He is well-developed.   HENT:      Head: Normocephalic and atraumatic.   Eyes:      General:         Right eye: No discharge.         Left eye: No discharge.      Conjunctiva/sclera: Conjunctivae normal.   Neck:      Musculoskeletal: Normal range of motion.      Thyroid: No thyromegaly.   Cardiovascular:      Rate and Rhythm: Normal rate and regular rhythm.      Heart sounds: No murmur.   Pulmonary:      Effort: Pulmonary effort is normal. No respiratory distress.      Breath sounds: Normal breath sounds.   Abdominal:      General: Bowel sounds are normal. There is no distension.      Palpations: Abdomen is soft. There is no mass.      Tenderness: There is no abdominal tenderness.   Musculoskeletal:         General: Swelling, tenderness and signs of injury present. No deformity.      Right lower leg: Edema present.      Comments: Picture placed in chart, cellulitis outlined:  Decrease lower extremity range of motion and pain on standing   Skin:     General: Skin is warm and dry.   Neurological:      Mental Status: He is alert and oriented to person, place, and time.   Psychiatric:         Behavior: Behavior normal.             Significant Labs:   CBC:   No results for input(s): WBC, HGB, HCT, PLT in the last 48 hours.  CMP:   Recent Labs   Lab 11/19/20  0544 11/20/20  0306   CREATININE 1.2 0.9   EGFRNONAA >60 >60       Significant Imaging:   Imaging Results          CT Leg (Tibia-Fibula) Wtih Contrast Left (Final result)  Result time 11/16/20 19:05:54    Final result by Sharmaine Ramos MD (11/16/20 19:05:54)                 Impression:      Extensive cellulitis, as above described.  Small fluid seen particularly overlying the thickened superficial fascia of the posterior lower leg.  No bony abnormality  detected.      Electronically signed by: Sharmaine Ramos  Date:    11/16/2020  Time:    19:05             Narrative:    EXAMINATION:  CT LEG WITH CONTRAST LEFT    CLINICAL HISTORY:  Lower leg swelling/redness, cellulitis suspected;    TECHNIQUE:  1.25 mm unenhanced axial images were obtained through the left lower leg.  Coronal and sagittal reformatted images were provided.    COMPARISON:  None.    FINDINGS:  Extensive reticular pattern in diffuse thickening of the subcutaneous tissue are seen particularly at the lateral and posterior aspect of the lower extremity.  There is thickening of the underlying superficial fascia in the posterior lower leg.  There is a crescent of fluid seen at the posterior aspect of the middle 3rd of the leg overlying the posterior musculature.  There is a tiny suprapatellar effusion.  There is no acute fracture or dislocation.  There is no bony destruction.

## 2020-11-20 NOTE — NURSING
Spoke to Dr. Gramajo and informed him patient has MRSA in LLE wound. New orders as follows:    Contact Isolation.

## 2020-11-20 NOTE — CONSULTS
[facility]  Infectious Disease  Consult Note    Patient Name: Luca Ferraro  MRN: [id number]  Admission Date: 11/16/2020  Hospital Length of Stay: 1 days  Attending Physician: Melanie Blanc MD  Primary Care Provider: Primary Doctor No     Isolation Status: No active isolations    Patient information was obtained from patient and ER records.      Inpatient consult to Infectious Diseases  Consult performed by: Sophia Hewitt MD  Consult ordered by: NADINE Vu        Assessment/Plan:     * Cellulitis and abscess of left leg  35M admitted with leg abscess and fever. S/p I&D with Gross purulence noted. infection noted to be superior to fascia. Operative cultures with staph aureus, susceptiblities pending. BCx 11/16 - NGTD. Has been on vanc/ceftriaxone and then ceftriaxone changed to zosyn. ID c/f abx recs    Staph aureus likely culprit. Sometimes these infections can be polymicrobial. Appreciate aggressive source control of infection    Recommendations:   - continue vanc/ceftriaxone/metronidazole for now until cultures finalize. Avoid vanc/zosyn combo 2/2 risk of GRACE  - f/u surgical cultures  - if nothing else grows, planning on transitioning to oral antibiotic regimen to target the staph aureus x 14 days from I&D (likely either cefadroxil or doxycycline, TBD based on micro susceptibilities)            Thank you for your consult. I will follow-up with patient. Please contact us if you have any additional questions.    Sophia Hewitt MD  Infectious Disease  [facility]    Subjective:     Principal Problem: Cellulitis and abscess of left leg    HPI:   35M admitted with increasing L calf swelling and pain. Thought he had bite, but didn't see anything bite him. Saw the pimple and tried to pop it. then Noted puss draining from wound. Reports low grade fevers prior to arrival. Came to hospital for increasing pain and swelling. Denies IVDU    S/p I&D with ortho. Per op  "note "Gross purulence was encountered" .. "There was some necrotic fat but the fascia appeared to be intact and was not violated"    Has been on vanc/ceftriaxone and then ceftriaxone changed to zosyn.    BCx 11/16 - NGTD        Specimen Information: Leg, Left; Wound        Component 1d ago   Aerobic Bacterial Culture Abnormal   STAPHYLOCOCCUS AUREUS   Moderate   Susceptibility pending               ID c/f abx recs        History reviewed. No pertinent past medical history.    Past Surgical History:   Procedure Laterality Date    DEBRIDEMENT OF LOWER EXTREMITY Left 11/18/2020    Procedure: DEBRIDEMENT, LOWER EXTREMITY-  LOWER LEG;  Surgeon: Juliocesar Roberson MD;  Location: [facility];  Service: Orthopedics;  Laterality: Left;       Review of patient's allergies indicates:  No Known Allergies    No current facility-administered medications on file prior to encounter.      Current Outpatient Medications on File Prior to Encounter   Medication Sig    oxyCODONE (OXYCONTIN) 10 mg 12 hr tablet Take 10 mg by mouth every 12 (twelve) hours as needed for Pain.     Family History     None        Tobacco Use    Smoking status: Current Every Day Smoker     Packs/day: 1.00     Types: Cigarettes    Smokeless tobacco: Never Used   Substance and Sexual Activity    Alcohol use: Yes    Drug use: Not Currently     Types: Marijuana    Sexual activity: Yes     Review of Systems   Constitutional: Positive for fever. Negative for chills.   HENT: Negative for nosebleeds and tinnitus.    Eyes: Negative for photophobia and visual disturbance.   Respiratory: Negative for shortness of breath and wheezing.    Cardiovascular: Negative for chest pain, palpitations and leg swelling.   Gastrointestinal: Negative for abdominal distention, nausea and vomiting.   Genitourinary: Negative for dysuria, flank pain and hematuria.   Musculoskeletal: Negative for gait problem and joint swelling.   Skin: Positive for color change, rash and wound. "   Neurological: Negative for seizures and syncope.     Objective:     Vital Signs (Most Recent):  Temp: 99 °F (37.2 °C) (11/19/20 1629)  Pulse: 91 (11/19/20 1629)  Resp: 18 (11/19/20 1629)  BP: (!) 143/85 (11/19/20 1629)  SpO2: 98 % (11/19/20 1629) Vital Signs (24h Range):  Temp:  [98 °F (36.7 °C)-99 °F (37.2 °C)] 99 °F (37.2 °C)  Pulse:  [78-91] 91  Resp:  [17-20] 18  SpO2:  [94 %-98 %] 98 %  BP: (106-143)/(57-85) 143/85     Weight: 112.9 kg (249 lb)  Body mass index is 34.73 kg/m².    Physical Exam  Vitals signs and nursing note reviewed.   Constitutional:       General: He is not in acute distress.     Appearance: He is well-developed.   HENT:      Head: Normocephalic and atraumatic.   Eyes:      General:         Right eye: No discharge.         Left eye: No discharge.      Conjunctiva/sclera: Conjunctivae normal.   Neck:      Musculoskeletal: Normal range of motion.      Thyroid: No thyromegaly.   Cardiovascular:      Rate and Rhythm: Normal rate and regular rhythm.      Heart sounds: No murmur.   Pulmonary:      Effort: Pulmonary effort is normal. No respiratory distress.      Breath sounds: Normal breath sounds.   Abdominal:      General: Bowel sounds are normal. There is no distension.      Palpations: Abdomen is soft. There is no mass.      Tenderness: There is no abdominal tenderness.   Musculoskeletal:         General: Swelling, tenderness and signs of injury present. No deformity.      Right lower leg: Edema present.      Comments: Post op dressings c/d/i. See photo   Skin:     General: Skin is warm and dry.   Neurological:      Mental Status: He is alert and oriented to person, place, and time.   Psychiatric:         Behavior: Behavior normal.                 Significant Labs:   CBC:   Recent Labs   Lab 11/18/20  0946   WBC 7.42   HGB 12.7*   HCT 37.6*        CMP:   Recent Labs   Lab 11/18/20  0946 11/19/20  0544     --    K 4.1  --      --    CO2 26  --    GLU 91  --    BUN 5*  --     CREATININE 0.9 1.2   CALCIUM 8.9  --    PROT 6.4  --    ALBUMIN 2.8*  --    BILITOT 0.4  --    ALKPHOS 51*  --    AST 20  --    ALT 17  --    ANIONGAP 6*  --    EGFRNONAA >60 >60       Significant Imaging:   Imaging Results          CT Leg (Tibia-Fibula) Wtih Contrast Left (Final result)  Result time 11/16/20 19:05:54    Final result by Sharmaine Ramos MD (11/16/20 19:05:54)                 Impression:      Extensive cellulitis, as above described.  Small fluid seen particularly overlying the thickened superficial fascia of the posterior lower leg.  No bony abnormality detected.      Electronically signed by: Sharmaine Ramos  Date:    11/16/2020  Time:    19:05             Narrative:    EXAMINATION:  CT LEG WITH CONTRAST LEFT    CLINICAL HISTORY:  Lower leg swelling/redness, cellulitis suspected;    TECHNIQUE:  1.25 mm unenhanced axial images were obtained through the left lower leg.  Coronal and sagittal reformatted images were provided.    COMPARISON:  None.    FINDINGS:  Extensive reticular pattern in diffuse thickening of the subcutaneous tissue are seen particularly at the lateral and posterior aspect of the lower extremity.  There is thickening of the underlying superficial fascia in the posterior lower leg.  There is a crescent of fluid seen at the posterior aspect of the middle 3rd of the leg overlying the posterior musculature.  There is a tiny suprapatellar effusion.  There is no acute fracture or dislocation.  There is no bony destruction.

## 2020-11-20 NOTE — SUBJECTIVE & OBJECTIVE
Review of Systems   Cardiovascular: Positive for irregular heartbeat and leg swelling. Negative for chest pain, dyspnea on exertion, near-syncope, orthopnea, palpitations and syncope.   Respiratory: Negative for shortness of breath, sputum production and wheezing.      Objective:     Vital Signs (Most Recent):  Temp: 98.1 °F (36.7 °C) (11/20/20 0742)  Pulse: 71 (11/20/20 0742)  Resp: 17 (11/20/20 0742)  BP: 119/71 (11/20/20 0742)  SpO2: 98 % (11/20/20 0742) Vital Signs (24h Range):  Temp:  [98 °F (36.7 °C)-99.6 °F (37.6 °C)] 98.1 °F (36.7 °C)  Pulse:  [71-91] 71  Resp:  [16-18] 17  SpO2:  [94 %-98 %] 98 %  BP: (108-143)/(55-85) 119/71     Weight: 112.9 kg (249 lb)  Body mass index is 34.73 kg/m².     SpO2: 98 %  O2 Device (Oxygen Therapy): room air      Intake/Output Summary (Last 24 hours) at 11/20/2020 0853  Last data filed at 11/20/2020 0615  Gross per 24 hour   Intake 3286 ml   Output 2401 ml   Net 885 ml       Lines/Drains/Airways     Peripheral Intravenous Line                 Peripheral IV - Single Lumen 11/16/20 1608 20 G Right Antecubital 3 days         Peripheral IV - Single Lumen 11/18/20 1250 20 G Left Forearm 1 day                Physical Exam   Constitutional: He is oriented to person, place, and time. He appears well-developed and well-nourished.   HENT:   Head: Normocephalic.   Eyes: Pupils are equal, round, and reactive to light. Conjunctivae are normal.   Neck: Normal range of motion. Neck supple.   Cardiovascular: Normal rate, regular rhythm and normal heart sounds.   Pulmonary/Chest: Effort normal and breath sounds normal.   Abdominal: Soft. Bowel sounds are normal.   Musculoskeletal:      Left lower leg: Edema present.   Neurological: He is alert and oriented to person, place, and time.   Skin: Skin is warm.   Vitals reviewed.      Significant Labs:   BMP:   Recent Labs   Lab 11/18/20  0946 11/19/20  0544 11/20/20  0306   GLU 91  --   --      --   --    K 4.1  --   --      --    --    CO2 26  --   --    BUN 5*  --   --    CREATININE 0.9 1.2 0.9   CALCIUM 8.9  --   --    MG 2.1  --   --    , CBC   Recent Labs   Lab 11/18/20  0946   WBC 7.42   HGB 12.7*   HCT 37.6*      , Lipid Panel No results for input(s): CHOL, HDL, LDLCALC, TRIG, CHOLHDL in the last 48 hours. and Troponin No results for input(s): TROPONINI in the last 48 hours.    Significant Imaging: NSVT noted.  Other telemetry abnormalities appear to be artifactual.

## 2020-11-20 NOTE — SUBJECTIVE & OBJECTIVE
History reviewed. No pertinent past medical history.    Past Surgical History:   Procedure Laterality Date    DEBRIDEMENT OF LOWER EXTREMITY Left 11/18/2020    Procedure: DEBRIDEMENT, LOWER EXTREMITY-  LOWER LEG;  Surgeon: Juliocesar Roberson MD;  Location: [facility] OR;  Service: Orthopedics;  Laterality: Left;       Review of patient's allergies indicates:  No Known Allergies    No current facility-administered medications on file prior to encounter.      Current Outpatient Medications on File Prior to Encounter   Medication Sig    oxyCODONE (OXYCONTIN) 10 mg 12 hr tablet Take 10 mg by mouth every 12 (twelve) hours as needed for Pain.     Family History     None        Tobacco Use    Smoking status: Current Every Day Smoker     Packs/day: 1.00     Types: Cigarettes    Smokeless tobacco: Never Used   Substance and Sexual Activity    Alcohol use: Yes    Drug use: Not Currently     Types: Marijuana    Sexual activity: Yes     Review of Systems   Constitutional: Positive for fever. Negative for chills.   HENT: Negative for nosebleeds and tinnitus.    Eyes: Negative for photophobia and visual disturbance.   Respiratory: Negative for shortness of breath and wheezing.    Cardiovascular: Negative for chest pain, palpitations and leg swelling.   Gastrointestinal: Negative for abdominal distention, nausea and vomiting.   Genitourinary: Negative for dysuria, flank pain and hematuria.   Musculoskeletal: Negative for gait problem and joint swelling.   Skin: Positive for color change, rash and wound.   Neurological: Negative for seizures and syncope.     Objective:     Vital Signs (Most Recent):  Temp: 99 °F (37.2 °C) (11/19/20 1629)  Pulse: 91 (11/19/20 1629)  Resp: 18 (11/19/20 1629)  BP: (!) 143/85 (11/19/20 1629)  SpO2: 98 % (11/19/20 1629) Vital Signs (24h Range):  Temp:  [98 °F (36.7 °C)-99 °F (37.2 °C)] 99 °F (37.2 °C)  Pulse:  [78-91] 91  Resp:  [17-20] 18  SpO2:  [94 %-98 %] 98 %  BP: (106-143)/(57-85) 143/85      Weight: 112.9 kg (249 lb)  Body mass index is 34.73 kg/m².    Physical Exam  Vitals signs and nursing note reviewed.   Constitutional:       General: He is not in acute distress.     Appearance: He is well-developed.   HENT:      Head: Normocephalic and atraumatic.   Eyes:      General:         Right eye: No discharge.         Left eye: No discharge.      Conjunctiva/sclera: Conjunctivae normal.   Neck:      Musculoskeletal: Normal range of motion.      Thyroid: No thyromegaly.   Cardiovascular:      Rate and Rhythm: Normal rate and regular rhythm.      Heart sounds: No murmur.   Pulmonary:      Effort: Pulmonary effort is normal. No respiratory distress.      Breath sounds: Normal breath sounds.   Abdominal:      General: Bowel sounds are normal. There is no distension.      Palpations: Abdomen is soft. There is no mass.      Tenderness: There is no abdominal tenderness.   Musculoskeletal:         General: Swelling, tenderness and signs of injury present. No deformity.      Right lower leg: Edema present.      Comments: Post op dressings c/d/i. See photo   Skin:     General: Skin is warm and dry.   Neurological:      Mental Status: He is alert and oriented to person, place, and time.   Psychiatric:         Behavior: Behavior normal.                 Significant Labs:   CBC:   Recent Labs   Lab 11/18/20  0946   WBC 7.42   HGB 12.7*   HCT 37.6*        CMP:   Recent Labs   Lab 11/18/20  0946 11/19/20  0544     --    K 4.1  --      --    CO2 26  --    GLU 91  --    BUN 5*  --    CREATININE 0.9 1.2   CALCIUM 8.9  --    PROT 6.4  --    ALBUMIN 2.8*  --    BILITOT 0.4  --    ALKPHOS 51*  --    AST 20  --    ALT 17  --    ANIONGAP 6*  --    EGFRNONAA >60 >60       Significant Imaging:   Imaging Results          CT Leg (Tibia-Fibula) Wtih Contrast Left (Final result)  Result time 11/16/20 19:05:54    Final result by Sharmaine Ramos MD (11/16/20 19:05:54)                 Impression:      Extensive  cellulitis, as above described.  Small fluid seen particularly overlying the thickened superficial fascia of the posterior lower leg.  No bony abnormality detected.      Electronically signed by: Sharmaine Ramos  Date:    11/16/2020  Time:    19:05             Narrative:    EXAMINATION:  CT LEG WITH CONTRAST LEFT    CLINICAL HISTORY:  Lower leg swelling/redness, cellulitis suspected;    TECHNIQUE:  1.25 mm unenhanced axial images were obtained through the left lower leg.  Coronal and sagittal reformatted images were provided.    COMPARISON:  None.    FINDINGS:  Extensive reticular pattern in diffuse thickening of the subcutaneous tissue are seen particularly at the lateral and posterior aspect of the lower extremity.  There is thickening of the underlying superficial fascia in the posterior lower leg.  There is a crescent of fluid seen at the posterior aspect of the middle 3rd of the leg overlying the posterior musculature.  There is a tiny suprapatellar effusion.  There is no acute fracture or dislocation.  There is no bony destruction.

## 2020-11-20 NOTE — SUBJECTIVE & OBJECTIVE
History reviewed. No pertinent past medical history.    Past Surgical History:   Procedure Laterality Date    DEBRIDEMENT OF LOWER EXTREMITY Left 11/18/2020    Procedure: DEBRIDEMENT, LOWER EXTREMITY-  LOWER LEG;  Surgeon: Juliocesar Roberson MD;  Location: [facility];  Service: Orthopedics;  Laterality: Left;       Review of patient's allergies indicates:  No Known Allergies    No current facility-administered medications on file prior to encounter.      Current Outpatient Medications on File Prior to Encounter   Medication Sig    oxyCODONE (OXYCONTIN) 10 mg 12 hr tablet Take 10 mg by mouth every 12 (twelve) hours as needed for Pain.     Family History     None        Tobacco Use    Smoking status: Current Every Day Smoker     Packs/day: 1.00     Types: Cigarettes    Smokeless tobacco: Never Used   Substance and Sexual Activity    Alcohol use: Yes    Drug use: Not Currently     Types: Marijuana    Sexual activity: Yes     Review of Systems   Constitution: Negative.   HENT: Negative.    Eyes: Negative.    Cardiovascular: Positive for leg swelling.   Respiratory: Negative.    Endocrine: Negative.    Hematologic/Lymphatic: Negative.    Skin: Negative.    Musculoskeletal: Negative.    Gastrointestinal: Negative.    Genitourinary: Negative.    Neurological: Negative.    Psychiatric/Behavioral: Negative.    Allergic/Immunologic: Negative.      Objective:     Vital Signs (Most Recent):  Temp: 99 °F (37.2 °C) (11/19/20 1629)  Pulse: 91 (11/19/20 1629)  Resp: 18 (11/19/20 1629)  BP: (!) 143/85 (11/19/20 1629)  SpO2: 98 % (11/19/20 1629) Vital Signs (24h Range):  Temp:  [98 °F (36.7 °C)-99 °F (37.2 °C)] 99 °F (37.2 °C)  Pulse:  [78-94] 91  Resp:  [17-20] 18  SpO2:  [94 %-98 %] 98 %  BP: (106-143)/(57-85) 143/85     Weight: 112.9 kg (249 lb)  Body mass index is 34.73 kg/m².    SpO2: 98 %  O2 Device (Oxygen Therapy): room air      Intake/Output Summary (Last 24 hours) at 11/19/2020 1817  Last data filed at 11/19/2020  1300  Gross per 24 hour   Intake 820 ml   Output 1825 ml   Net -1005 ml       Lines/Drains/Airways     Peripheral Intravenous Line                 Peripheral IV - Single Lumen 11/16/20 1608 20 G Right Antecubital 3 days         Peripheral IV - Single Lumen 11/18/20 1250 20 G Left Forearm 1 day                Physical Exam   Constitutional: He is oriented to person, place, and time. He appears well-developed and well-nourished.   HENT:   Head: Normocephalic.   Eyes: Pupils are equal, round, and reactive to light. Conjunctivae are normal.   Neck: Normal range of motion. Neck supple.   Cardiovascular: Normal rate, regular rhythm and normal heart sounds.   Pulmonary/Chest: Effort normal and breath sounds normal.   Abdominal: Soft. Bowel sounds are normal.   Neurological: He is alert and oriented to person, place, and time.   Skin: Skin is warm.   Vitals reviewed.      Significant Labs:   BMP:   Recent Labs   Lab 11/18/20  0946 11/19/20  0544   GLU 91  --      --    K 4.1  --      --    CO2 26  --    BUN 5*  --    CREATININE 0.9 1.2   CALCIUM 8.9  --    MG 2.1  --    , CMP   Recent Labs   Lab 11/18/20  0946 11/19/20  0544     --    K 4.1  --      --    CO2 26  --    GLU 91  --    BUN 5*  --    CREATININE 0.9 1.2   CALCIUM 8.9  --    PROT 6.4  --    ALBUMIN 2.8*  --    BILITOT 0.4  --    ALKPHOS 51*  --    AST 20  --    ALT 17  --    ANIONGAP 6*  --    ESTGFRAFRICA >60 >60   EGFRNONAA >60 >60   , CBC   Recent Labs   Lab 11/18/20  0946   WBC 7.42   HGB 12.7*   HCT 37.6*      , INR No results for input(s): INR, PROTIME in the last 48 hours., Lipid Panel No results for input(s): CHOL, HDL, LDLCALC, TRIG, CHOLHDL in the last 48 hours., Troponin No results for input(s): TROPONINI in the last 48 hours. and All pertinent lab results from the last 24 hours have been reviewed.    Significant Imaging: Echocardiogram:   Transthoracic echo (TTE) complete (Cupid Only):   Results for orders placed or  performed during the hospital encounter of 11/16/20   Echo Color Flow Doppler? Yes   Result Value Ref Range    BSA 2.38 m2    LA WIDTH 4.34 cm    AORTIC VALVE CUSP SEPERATION 2.81 cm    PV PEAK VELOCITY 1.22 cm/s    LVIDd 5.20 3.5 - 6.0 cm    IVS 1.20 (A) 0.6 - 1.1 cm    Posterior Wall 1.39 (A) 0.6 - 1.1 cm    Ao root annulus 3.27 cm    LVIDs 3.57 2.1 - 4.0 cm    FS 31 28 - 44 %    LA volume 55.50 cm3    STJ 2.89 cm    Ascending aorta 2.95 cm    LV mass 276.92 g    LA size 3.20 cm    RVDD 3.34 cm    TAPSE 2.27 cm    Left Ventricle Relative Wall Thickness 0.53 cm    AV mean gradient 4 mmHg    AV valve area 4.23 cm2    AV Velocity Ratio 0.85     AV index (prosthetic) 0.81     MV valve area p 1/2 method 3.13 cm2    E/A ratio 2.10     E wave decelartion time 242.48 msec    IVRT 79.92 msec    Pulm vein S/D ratio 1.70     LVOT diameter 2.58 cm    LVOT area 5.2 cm2    LVOT peak dago 1.10 m/s    LVOT peak VTI 21.84 cm    Ao peak dago 1.29 m/s    Ao VTI 27.01 cm    LVOT stroke volume 114.12 cm3    AV peak gradient 7 mmHg    MV Peak E Dago 1.01 m/s    MV stenosis pressure 1/2 time 70.32 ms    MV Peak A Dago 0.48 m/s    PV Peak S Dago 0.63 m/s    PV Peak D Dago 0.37 m/s    LV Systolic Volume 53.19 mL    LV Systolic Volume Index 23.0 mL/m2    LV Diastolic Volume 129.75 mL    LV Diastolic Volume Index 56.05 mL/m2    LA Volume Index 24.0 mL/m2    LV Mass Index 120 g/m2    RA Major Axis 5.24 cm    Left Atrium Minor Axis 4.57 cm    Left Atrium Major Axis 4.84 cm    RA Width 3.62 cm    Right Atrial Pressure (from IVC) 3 mmHg    Narrative    · The left ventricle is normal in size with normal systolic function. The   estimated ejection fraction is 60%.  · There is mild left ventricular concentric hypertrophy.  · Normal left ventricular diastolic function.  · Normal right ventricular size.  · Mild left atrial enlargement.  · Normal central venous pressure (3 mmHg).

## 2020-11-21 LAB
BACTERIA BLD CULT: NORMAL
BACTERIA BLD CULT: NORMAL
CREAT SERPL-MCNC: 0.8 MG/DL (ref 0.5–1.4)
EST. GFR  (AFRICAN AMERICAN): >60 ML/MIN/1.73 M^2
EST. GFR  (NON AFRICAN AMERICAN): >60 ML/MIN/1.73 M^2

## 2020-11-21 PROCEDURE — 25000003 PHARM REV CODE 250: Performed by: PHYSICIAN ASSISTANT

## 2020-11-21 PROCEDURE — 25000003 PHARM REV CODE 250: Performed by: NURSE PRACTITIONER

## 2020-11-21 PROCEDURE — 36415 COLL VENOUS BLD VENIPUNCTURE: CPT

## 2020-11-21 PROCEDURE — 63600175 PHARM REV CODE 636 W HCPCS: Performed by: STUDENT IN AN ORGANIZED HEALTH CARE EDUCATION/TRAINING PROGRAM

## 2020-11-21 PROCEDURE — 82565 ASSAY OF CREATININE: CPT

## 2020-11-21 PROCEDURE — 63600175 PHARM REV CODE 636 W HCPCS: Performed by: HOSPITALIST

## 2020-11-21 PROCEDURE — 11000001 HC ACUTE MED/SURG PRIVATE ROOM

## 2020-11-21 PROCEDURE — 25000003 PHARM REV CODE 250: Performed by: STUDENT IN AN ORGANIZED HEALTH CARE EDUCATION/TRAINING PROGRAM

## 2020-11-21 RX ADMIN — ENOXAPARIN SODIUM 40 MG: 40 INJECTION SUBCUTANEOUS at 05:11

## 2020-11-21 RX ADMIN — VANCOMYCIN HYDROCHLORIDE 2250 MG: 10 INJECTION, POWDER, LYOPHILIZED, FOR SOLUTION INTRAVENOUS at 03:11

## 2020-11-21 RX ADMIN — TRAMADOL HYDROCHLORIDE 100 MG: 50 TABLET, FILM COATED ORAL at 12:11

## 2020-11-21 RX ADMIN — SODIUM CHLORIDE: 0.9 INJECTION, SOLUTION INTRAVENOUS at 10:11

## 2020-11-21 RX ADMIN — VANCOMYCIN HYDROCHLORIDE 2250 MG: 10 INJECTION, POWDER, LYOPHILIZED, FOR SOLUTION INTRAVENOUS at 05:11

## 2020-11-21 NOTE — NURSING
LLE wound care performed. Patient premedicated with 100 mg of Tramadol prior to dressing being applied. Spoke to patient again about wanting to leave AMA. Explained the risks of not completing IV antibiotic therapy including possible loss of leg. Patient agreed to stay inpatient until discharged. Will inform primary Dr. Gramajo.  No acute distress noted; will continue to monitor.

## 2020-11-21 NOTE — NURSING
Spoke to Dr. Zamora in regards to wound care for patient. New order as follows:    Apply 4x4 gauze and wrap with ACE wrap.

## 2020-11-21 NOTE — PLAN OF CARE
Problem: Adult Inpatient Plan of Care  Goal: Plan of Care Review  Outcome: Ongoing, Progressing  Flowsheets (Taken 11/21/2020 9043)  Plan of Care Reviewed With: patient   Patient remained free from falls, trauma, and injuries throughout shift. Complaints of pain controlled with prn analgesics. No complaints of nausea or vomiting. IV fluids and IV antibiotics and medications administered as prescribed.  Dressing to LLE clean, dry, and intact. Patient has not ambulated; encouraged ambulation. Contact precautions maintained. No acute distress noted; will continue to monitor.

## 2020-11-21 NOTE — PLAN OF CARE
Problem: Adult Inpatient Plan of Care  Goal: Plan of Care Review  Outcome: Ongoing, Progressing  Flowsheets (Taken 11/20/2020 1811)  Plan of Care Reviewed With:   patient   mother  Goal: Patient-Specific Goal (Individualization)  Outcome: Ongoing, Progressing  Goal: Absence of Hospital-Acquired Illness or Injury  Outcome: Ongoing, Progressing  Goal: Optimal Comfort and Wellbeing  Outcome: Ongoing, Progressing  Goal: Readiness for Transition of Care  Outcome: Ongoing, Progressing  Goal: Rounds/Family Conference  Outcome: Ongoing, Progressing     Problem: Adjustment to Illness (Sepsis/Septic Shock)  Goal: Optimal Coping  Outcome: Ongoing, Progressing     Problem: Bleeding (Sepsis/Septic Shock)  Goal: Absence of Bleeding  Outcome: Ongoing, Progressing     Problem: Glycemic Control Impaired (Sepsis/Septic Shock)  Goal: Blood Glucose Level Within Desired Range  Outcome: Ongoing, Progressing     Problem: Hemodynamic Instability (Sepsis/Septic Shock)  Goal: Effective Tissue Perfusion  Outcome: Ongoing, Progressing     Problem: Infection (Sepsis/Septic Shock)  Goal: Absence of Infection Signs/Symptoms  Outcome: Ongoing, Progressing     Problem: Nutrition Impaired (Sepsis/Septic Shock)  Goal: Optimal Nutrition Intake  Outcome: Ongoing, Progressing     Problem: Respiratory Compromise (Sepsis/Septic Shock)  Goal: Effective Oxygenation and Ventilation  Outcome: Ongoing, Progressing     Problem: Fall Injury Risk  Goal: Absence of Fall and Fall-Related Injury  Outcome: Ongoing, Progressing     Problem: Pain Acute  Goal: Optimal Pain Control  Outcome: Ongoing, Progressing     Problem: Infection  Goal: Infection Symptom Resolution  Outcome: Ongoing, Progressing

## 2020-11-21 NOTE — NURSING
Report received from IRA Mcfarland RN. Patient lying in bed resting comfortably. LLE elevated on pillows, dressing in place.  No acute cardiac or respiratory distress noted. Safety measures maintained; wheels locked, bed in lowest position, bed alarm active and engaged, and call light in reach. Will continue to monitor.

## 2020-11-21 NOTE — PLAN OF CARE
Problem: Adult Inpatient Plan of Care  Goal: Plan of Care Review  Outcome: Ongoing, Progressing  Flowsheets (Taken 11/20/2020 1811)  Plan of Care Reviewed With:   patient   mother   Patient remained free from falls, trauma, and injuries throughout shift. No complaints of pain, nausea, or vomiting. IV fluids, IV antibiotics, and medications administered as prescribed. Patient did not ambulate throughout shift; educated and encouraged patient to ambulate around room. Wound culture grew MRSA. Contact isolation initiated. Patient elevated LLE throughout shift. Dressing with dried drainage. No acute distress noted.

## 2020-11-21 NOTE — ASSESSMENT & PLAN NOTE
Echo did not show any significant structural abnormality.  Brief episode of NSVT in the setting of sepsis.  Keep electrolytes balanced.  Potassium around 4 and magnesium around 2.  Continue to monitor on tele monitoring.  Further workup to be determined by  clinical course.  
 Echo did not show any significant structural abnormality.  Brief episode of NSVT in the setting of sepsis.  Keep electrolytes balanced.  Potassium around 4 and magnesium around 2.  Continue to monitor on tele monitoring.  Further workup to be determined by  clinical course.    11/20/2020-clinically stable.  Echocardiogram reviewed.  Monitor.  
11/16/2020:  With complaints of lower extremity swelling, and a wound that occurred while working outside from a bite. Afebrile without leukocytosis, CT with cellulitis. Sensation intact and distal pulse intact. Picture in chart  -started on rocephin/vanc  -blood cultures pending  -ortho consulted    11/17/2020:  Patient fever I will at the time of presentation with P temp 101.8°.  Started on dual antibiotics CT shows cellulitis that is consistent with his clinical physical assessment.  Decreased range of motion and pain on standing and to touch extremity warm with streaking.  Continue dual antibiotics awaiting surgery opinion likely home in a.m. if improves  
11/18/20: Minimal improvement overnight, continues to complain of pain upon standing and decreased ROM - ortho following - to OR for I&D - follow cultures; change IV ABX to zosyn continue vanc - consult to ID    11/17/2020:  Patient fever I will at the time of presentation with P temp 101.8°.  Started on dual antibiotics CT shows cellulitis that is consistent with his clinical physical assessment.  Decreased range of motion and pain on standing and to touch extremity warm with streaking.  Continue dual antibiotics awaiting surgery opinion likely home in a.m. if improves    11/16/2020:  With complaints of lower extremity swelling, and a wound that occurred while working outside from a bite. Afebrile without leukocytosis, CT with cellulitis. Sensation intact and distal pulse intact. Picture in chart  -started on rocephin/vanc  -blood cultures pending  -ortho consulted  
11/18/20: Minimal improvement overnight, continues to complain of pain upon standing and decreased ROM - ortho following - to OR for I&D - follow cultures; change IV ABX to zosyn continue vanc - consult to ID    11/17/2020:  Patient fever I will at the time of presentation with P temp 101.8°.  Started on dual antibiotics CT shows cellulitis that is consistent with his clinical physical assessment.  Decreased range of motion and pain on standing and to touch extremity warm with streaking.  Continue dual antibiotics awaiting surgery opinion likely home in a.m. if improves    11/16/2020:  With complaints of lower extremity swelling, and a wound that occurred while working outside from a bite. Afebrile without leukocytosis, CT with cellulitis. Sensation intact and distal pulse intact. Picture in chart  -started on rocephin/vanc  -blood cultures pending  -ortho consulted,  S/P I&D on 11.18.20,culture growing Staph aureus,will follow up with identification and sensetivity.  
11/18/20: Minimal improvement overnight, continues to complain of pain upon standing and decreased ROM - ortho following - to OR for I&D - follow cultures; change IV ABX to zosyn continue vanc - consult to ID    11/17/2020:  Patient fever I will at the time of presentation with P temp 101.8°.  Started on dual antibiotics CT shows cellulitis that is consistent with his clinical physical assessment.  Decreased range of motion and pain on standing and to touch extremity warm with streaking.  Continue dual antibiotics awaiting surgery opinion likely home in a.m. if improves    11/16/2020:  With complaints of lower extremity swelling, and a wound that occurred while working outside from a bite. Afebrile without leukocytosis, CT with cellulitis. Sensation intact and distal pulse intact. Picture in chart  -started on rocephin/vanc  -blood cultures pending  -ortho consulted,  S/P I&D on 11.18.20,culture growing Staph aureus,will follow up with identification and sensetivity.grow MRSA,leg still warm and swollen,will continue with IV Abx.  
11/18/20: Minimal improvement overnight, continues to complain of pain upon standing and decreased ROM - ortho following - to OR for I&D - follow cultures; change IV ABX to zosyn continue vanc - consult to ID    11/17/2020:  Patient fever I will at the time of presentation with P temp 101.8°.  Started on dual antibiotics CT shows cellulitis that is consistent with his clinical physical assessment.  Decreased range of motion and pain on standing and to touch extremity warm with streaking.  Continue dual antibiotics awaiting surgery opinion likely home in a.m. if improves    11/16/2020:  With complaints of lower extremity swelling, and a wound that occurred while working outside from a bite. Afebrile without leukocytosis, CT with cellulitis. Sensation intact and distal pulse intact. Picture in chart  -started on rocephin/vanc  -blood cultures pending  -ortho consulted,  S/P I&D on 11.18.20,culture growing Staph aureus,will follow up with identification and sensetivity.grow MRSA,leg still warm and swollen,will continue with IV Abx.  
2/2 cellulitis - he is reluctant to take pain medication due to past history of drug abuse; will order something light as he continues to complain of pain.    
35M admitted with leg abscess and fever. S/p I&D with Gross purulence noted. infection noted to be superior to fascia. Operative cultures with MRSA. BCx 11/16 - NGTD.  ID c/f abx recs    Recommendations:   - continue vancomycin while inpatient. Can stop ceftriaxone/metronidazole. When patient ready for discharge, de-escalate to PO doxycycline 100mg BID x 14 days (est end date 12/2/20). Counseled on side effects.   - please notify ID with any new growth on cultures.       
35M admitted with leg abscess and fever. S/p I&D with Gross purulence noted. infection noted to be superior to fascia. Operative cultures with staph aureus, susceptiblities pending. BCx 11/16 - NGTD. Has been on vanc/ceftriaxone and then ceftriaxone changed to zosyn. ID c/f abx recs    Staph aureus likely culprit. Sometimes these infections can be polymicrobial. Appreciate aggressive source control of infection    Recommendations:   - continue vanc/ceftriaxone/metronidazole for now until cultures finalize. Avoid vanc/zosyn combo 2/2 risk of GRACE  - f/u surgical cultures  - if nothing else grows, planning on transitioning to oral antibiotic regimen to target the staph aureus x 14 days from I&D (likely either cefadroxil or doxycycline, TBD based on micro susceptibilities)      
Greater than 3 minutes spent counseling patient on dangers of continued tobacco abuse. Will provide tobacco cessation education prior to discharge.   
He meets criteria for sepsis based on fever to 101.8F, tachycardia to 101.8F, and soruce of infection as antibiotics  -see above  -has been started on antibiotics/fluids with cultures as above,  -  
He meets criteria for sepsis based on fever to 101.8F, tachycardia to 101.8F, and soruce of infection as antibiotics  -see above  -has been started on antibiotics/fluids with cultures pending  -lactic acid pending  
He meets criteria for sepsis based on fever to 101.8F, tachycardia to 101.8F, and soruce of infection as antibiotics  -see above  -has been started on antibiotics/fluids with cultures pending  -lactic acid pending  
On Tele,resolved,cardiology is following..    
On Tele,resolved,cardiology is following..electrolyte is amee    
With complaints of lower extremity swelling, and a wound that occurred while working outside from a bite. Afebrile without leukocytosis, CT with cellulitis. Sensation intact and distal pulse intact. Picture in chart  -started on rocephin/vanc  -blood cultures pending  -ortho consulted  
36.5

## 2020-11-21 NOTE — NURSING
Patient wants to leave AMA because he is tired of being in room and wants to smoke cigarette. Offered to get order for nicotine patch, patient refused. Educated patient on the importance of staying hospital to finish IV antibiotic therapy.  Secure chat sent to Dr. Gramajo to inform him. Awaiting response.    Physical Therapy Daily Treatment        Visit Count: 12  Plan of Care: 2/6/2019 Through: 4/17/2019  Insurance Information: [employer]  80/20  BOMN    Precautions: None    SUBJECTIVE   I`m feeling a little better, my knees don`t feel as sore and stiff anymore, they are not locking up anymore since I got a new job 2 weeks ago. I got a Cortizone shot last Friday on my right knee and will get one on my left knee in a month.  Current Pain (0-10 scale):   Functional Change: less pain with lighter duty work.     OBJECTIVE       Treatment   Therapeutic Exercise:   Nustep L5 x 10 minutes  Stair stretches bilaterally  Step ups and downs on 6 inch step bilaterally  Heel raises   Standing hip abd/ext  Mini squats  Standing hamstring curl  Side stepping green theraband around knees both directions      Gait Training:  Ambulation 500 ft on level surfaces with cues for gait quality, increased hip and knee flex during swing  Stairs negotiation ascending with reciprocal pattern, descending with step to pattern, full flight of stairs     Skilled input: cues for proper form with stretches and exericses    Home Program:   Sitting LAQ  Standing hamstring curl  Standing hip ext/abd  Step ups on stair        Writer verbally educated the patient and received verbal consent from the patient on hand placement, positioning of patient, and techniques to be performed today including exercises, activity progression and how they are pertinent to the patient's plan of care.      Suggestions for next session as indicated: progress per plan of care, LE strengthening, balance. Stretching as needed    ASSESSMENT   Making progress with ambulating tolerance, stairs negotiation, knee mobility, pain symptoms. Not having the \"lcking feeling\" in the knee anymore since switching job roles with less lifting, carrying and weights. Descending stairs leading with right leg due to more stiffness than left knee.   Pain after treatment (patient reported, 0-10 scale) \"nt  too bad today\"  Result of above outlined education: Verbalizes understanding    THERAPY DAILY BILLING   Insurance: [employer]. N/A    Evaluation Procedures:  No evaluation codes were used on this date of service    Timed Procedures:  Gait Training, 15 minutes  Therapeutic Exercise, 25 minutes    Untimed Procedures:  No untimed codes were used on this date of service    Total Treatment Time: 40 minutes

## 2020-11-21 NOTE — PROGRESS NOTES
MIGUEL A per nurse  Pt reports pain is well controlled  AFVSS      PE:  aao fully    Left leg: Surgical dressing removed. 4x4 gauze packed in wound removed by me. No puss, healthy appearing wound bed. No active bleeding. NVI.         Cultures: +Staph aureus    A/P: Cellulitis of leg doing well will follow WBC  1) packing pulled and leg redressed  2) stable from ortho standpoint. Will defer to primary team and cardiology on timing of discharge.  3) will need to f/u with Dr Roberson on Wednesday, 11/25    MD Sera

## 2020-11-21 NOTE — SUBJECTIVE & OBJECTIVE
History reviewed. No pertinent past medical history.    Past Surgical History:   Procedure Laterality Date    DEBRIDEMENT OF LOWER EXTREMITY Left 11/18/2020    Procedure: DEBRIDEMENT, LOWER EXTREMITY-  LOWER LEG;  Surgeon: Juliocesar Roberson MD;  Location: [facility] OR;  Service: Orthopedics;  Laterality: Left;       Review of patient's allergies indicates:  No Known Allergies    No current facility-administered medications on file prior to encounter.      Current Outpatient Medications on File Prior to Encounter   Medication Sig    oxyCODONE (OXYCONTIN) 10 mg 12 hr tablet Take 10 mg by mouth every 12 (twelve) hours as needed for Pain.     Family History     None        Tobacco Use    Smoking status: Current Every Day Smoker     Packs/day: 1.00     Types: Cigarettes    Smokeless tobacco: Never Used   Substance and Sexual Activity    Alcohol use: Yes    Drug use: Not Currently     Types: Marijuana    Sexual activity: Yes     Review of Systems   Constitutional: Positive for fever. Negative for chills.   HENT: Negative for nosebleeds and tinnitus.    Eyes: Negative for photophobia and visual disturbance.   Respiratory: Negative for shortness of breath and wheezing.    Cardiovascular: Negative for chest pain, palpitations and leg swelling.   Gastrointestinal: Negative for abdominal distention, nausea and vomiting.   Genitourinary: Negative for dysuria, flank pain and hematuria.   Musculoskeletal: Negative for gait problem and joint swelling.   Skin: Positive for color change, rash and wound.   Neurological: Negative for seizures and syncope.     Objective:     Vital Signs (Most Recent):  Temp: 98.5 °F (36.9 °C) (11/21/20 0731)  Pulse: 72 (11/21/20 0731)  Resp: 18 (11/21/20 0731)  BP: 114/68 (11/21/20 0731)  SpO2: 99 % (11/21/20 0731) Vital Signs (24h Range):  Temp:  [97.3 °F (36.3 °C)-98.5 °F (36.9 °C)] 98.5 °F (36.9 °C)  Pulse:  [69-80] 72  Resp:  [17-20] 18  SpO2:  [96 %-99 %] 99 %  BP: (104-114)/(59-71) 114/68      Weight: 112.9 kg (249 lb)  Body mass index is 34.73 kg/m².    Physical Exam  Vitals signs and nursing note reviewed.   Constitutional:       General: He is not in acute distress.     Appearance: He is well-developed.   HENT:      Head: Normocephalic and atraumatic.   Eyes:      General:         Right eye: No discharge.         Left eye: No discharge.      Conjunctiva/sclera: Conjunctivae normal.   Neck:      Musculoskeletal: Normal range of motion.      Thyroid: No thyromegaly.   Cardiovascular:      Rate and Rhythm: Normal rate and regular rhythm.      Heart sounds: No murmur.   Pulmonary:      Effort: Pulmonary effort is normal. No respiratory distress.      Breath sounds: Normal breath sounds.   Abdominal:      General: Bowel sounds are normal. There is no distension.      Palpations: Abdomen is soft. There is no mass.      Tenderness: There is no abdominal tenderness.   Musculoskeletal:         General: Swelling, tenderness and signs of injury present. No deformity.      Right lower leg: Edema present.      Comments: Picture placed in chart, cellulitis outlined:  Decrease lower extremity range of motion and pain on standing   Skin:     General: Skin is warm and dry.   Neurological:      Mental Status: He is alert and oriented to person, place, and time.   Psychiatric:         Behavior: Behavior normal.             Significant Labs:   CBC:   No results for input(s): WBC, HGB, HCT, PLT in the last 48 hours.  CMP:   Recent Labs   Lab 11/20/20  0306 11/20/20  1104 11/21/20  0548   K  --  4.8  --    CREATININE 0.9  --  0.8   EGFRNONAA >60  --  >60       Significant Imaging:   Imaging Results          CT Leg (Tibia-Fibula) Wtih Contrast Left (Final result)  Result time 11/16/20 19:05:54    Final result by Sharmaine Ramos MD (11/16/20 19:05:54)                 Impression:      Extensive cellulitis, as above described.  Small fluid seen particularly overlying the thickened superficial fascia of the posterior  lower leg.  No bony abnormality detected.      Electronically signed by: Sharmaine Ramos  Date:    11/16/2020  Time:    19:05             Narrative:    EXAMINATION:  CT LEG WITH CONTRAST LEFT    CLINICAL HISTORY:  Lower leg swelling/redness, cellulitis suspected;    TECHNIQUE:  1.25 mm unenhanced axial images were obtained through the left lower leg.  Coronal and sagittal reformatted images were provided.    COMPARISON:  None.    FINDINGS:  Extensive reticular pattern in diffuse thickening of the subcutaneous tissue are seen particularly at the lateral and posterior aspect of the lower extremity.  There is thickening of the underlying superficial fascia in the posterior lower leg.  There is a crescent of fluid seen at the posterior aspect of the middle 3rd of the leg overlying the posterior musculature.  There is a tiny suprapatellar effusion.  There is no acute fracture or dislocation.  There is no bony destruction.

## 2020-11-21 NOTE — PLAN OF CARE
Problem: Adult Inpatient Plan of Care  Goal: Plan of Care Review  Outcome: Ongoing, Progressing  Goal: Patient-Specific Goal (Individualization)  Outcome: Ongoing, Progressing  Goal: Absence of Hospital-Acquired Illness or Injury  Outcome: Ongoing, Progressing  Intervention: Identify and Manage Fall Risk  Flowsheets (Taken 11/21/2020 0258)  Safety Promotion/Fall Prevention:   assistive device/personal item within reach   side rails raised x 2   nonskid shoes/socks when out of bed  Goal: Rounds/Family Conference  Outcome: Ongoing, Progressing     Problem: Bleeding (Sepsis/Septic Shock)  Goal: Absence of Bleeding  Outcome: Ongoing, Progressing     Problem: Fall Injury Risk  Goal: Absence of Fall and Fall-Related Injury  Outcome: Ongoing, Progressing  Intervention: Identify and Manage Contributors to Fall Injury Risk  Flowsheets (Taken 11/21/2020 0258)  Self-Care Promotion: independence encouraged  Medication Review/Management: medications reviewed  Intervention: Promote Injury-Free Environment  Flowsheets (Taken 11/21/2020 0258)  Safety Promotion/Fall Prevention:   assistive device/personal item within reach   side rails raised x 2   nonskid shoes/socks when out of bed  Environmental Safety Modification:   assistive device/personal items within reach   clutter free environment maintained

## 2020-11-21 NOTE — NURSING
pt awake, alert, and oriented x4.   independent of adl's. iv fluids running. abx cont as ordered. cardiac monitor in use. dressing to LLE c/d/i. hourly rounds made. call light within   reach. will continue to monitor.

## 2020-11-21 NOTE — NURSING
"Upon rounds, patient informed this RN that he wanted to "sign himself out". Patient stated, "I am tired of being in this room and I want to smoke a cigarette.  Educated patient on the importance of staying in the hospital to receive IV antibiotics, also educated patient on being on contact isolation and [facility] being a smoke-free hospital; understanding verbalized. Offered patient a nicotine patch, but he refused. " Patient's dressing to LLE was removed by ortho, no new orders to dress it. Patient is also frustrated by this. Informed patient that this RN will contact Ortho MD and obtain order for wound care. Will inform Dr. Gramajo about patient wanting to leave AMA.   "

## 2020-11-21 NOTE — PROGRESS NOTES
[facility] Hospital Medicine  Progress Note    Patient Name: Luca Ferraro  MRN: [id number]  Patient Class: IP- Inpatient   Admission Date: 11/16/2020  Length of Stay: 3 days  Attending Physician: Melanie Blanc MD  Primary Care Provider: Primary Doctor No        Subjective:     Principal Problem:Cellulitis and abscess of left leg        HPI:  Luca Ferraro 35 y.o. with tobacco abuse presents to the hospital with a chief complaint of lower extremity swelling. He reports since Friday he has had increasing lower extremity left swelling associated with an intermittent throbbing pain worsened with ambulation without alleviating factors. He has attempted no treatment at home. He has had increasing difficulty ambulating. He believes he was bitten by something that caused the initial swelling. He has been able to express pus and blood from the wound. He has had intermittent fevers at home. He denies fever, chest pain, SOB, N/V, abdominal pain, dizziness, dysuria, syncope. He smokes 1/2ppd he does not drink or use recreational drugs.     In the ED, CT with extensive cellulitis with small fluid seen overlying the thickened superifical fascia of the posterior leg, febrile to 101.8F and WBC of 12.03.     Overview/Hospital Course:  35-year-old  male presented for evaluation of left lower extremity swelling around the area of the catheterization.  Clinical assessment finds an extremely swollen calve much larger in comparison with the other calve.  There is an area that appears to be a puncture wound from some formed insect/crit her that may a bit in the patient with swelling and streaking down toward the ankle.  The area is warm and painful to touch.  There is decreased range of motion in lower extremity and pain upon standing.  Patient was started on dual IV antibiotics overnight and surgery consulted.  CT lower lid with contrast reveals an extensive cellulitis with small fluid  seen overlying the thickening superficial fascia of the posterior lower leg.  Also noted on CT is a suprapatellar effusion. Patient to OR today for I&D 2/2 to minimal improvement with dual ABX    Plan:  Continue dual antibiotics, change Rocephin to zosyn continue Vanc; Await cultures -- temp overnight peak 100.7 with LLE swelling showing minimal improvement - continues to complain of pain on standing and decreased ROM - streaking from calve down back of leg - pain support.  S/P I&D on 11.18.20,culture growing Staph aureus,will follow up with identification and sensetivity.grow MRSA,leg still warm and swollen,will continue with IV Abx.  Had NSVT,resolved,cardiology is following.electrolyte is normal.    History reviewed. No pertinent past medical history.    Past Surgical History:   Procedure Laterality Date    DEBRIDEMENT OF LOWER EXTREMITY Left 11/18/2020    Procedure: DEBRIDEMENT, LOWER EXTREMITY-  LOWER LEG;  Surgeon: Juliocesar Roberson MD;  Location: [facility];  Service: Orthopedics;  Laterality: Left;       Review of patient's allergies indicates:  No Known Allergies    No current facility-administered medications on file prior to encounter.      Current Outpatient Medications on File Prior to Encounter   Medication Sig    oxyCODONE (OXYCONTIN) 10 mg 12 hr tablet Take 10 mg by mouth every 12 (twelve) hours as needed for Pain.     Family History     None        Tobacco Use    Smoking status: Current Every Day Smoker     Packs/day: 1.00     Types: Cigarettes    Smokeless tobacco: Never Used   Substance and Sexual Activity    Alcohol use: Yes    Drug use: Not Currently     Types: Marijuana    Sexual activity: Yes     Review of Systems   Constitutional: Positive for fever. Negative for chills.   HENT: Negative for nosebleeds and tinnitus.    Eyes: Negative for photophobia and visual disturbance.   Respiratory: Negative for shortness of breath and wheezing.    Cardiovascular: Negative for chest pain, palpitations  and leg swelling.   Gastrointestinal: Negative for abdominal distention, nausea and vomiting.   Genitourinary: Negative for dysuria, flank pain and hematuria.   Musculoskeletal: Negative for gait problem and joint swelling.   Skin: Positive for color change, rash and wound.   Neurological: Negative for seizures and syncope.     Objective:     Vital Signs (Most Recent):  Temp: 98.5 °F (36.9 °C) (11/21/20 0731)  Pulse: 72 (11/21/20 0731)  Resp: 18 (11/21/20 0731)  BP: 114/68 (11/21/20 0731)  SpO2: 99 % (11/21/20 0731) Vital Signs (24h Range):  Temp:  [97.3 °F (36.3 °C)-98.5 °F (36.9 °C)] 98.5 °F (36.9 °C)  Pulse:  [69-80] 72  Resp:  [17-20] 18  SpO2:  [96 %-99 %] 99 %  BP: (104-114)/(59-71) 114/68     Weight: 112.9 kg (249 lb)  Body mass index is 34.73 kg/m².    Physical Exam  Vitals signs and nursing note reviewed.   Constitutional:       General: He is not in acute distress.     Appearance: He is well-developed.   HENT:      Head: Normocephalic and atraumatic.   Eyes:      General:         Right eye: No discharge.         Left eye: No discharge.      Conjunctiva/sclera: Conjunctivae normal.   Neck:      Musculoskeletal: Normal range of motion.      Thyroid: No thyromegaly.   Cardiovascular:      Rate and Rhythm: Normal rate and regular rhythm.      Heart sounds: No murmur.   Pulmonary:      Effort: Pulmonary effort is normal. No respiratory distress.      Breath sounds: Normal breath sounds.   Abdominal:      General: Bowel sounds are normal. There is no distension.      Palpations: Abdomen is soft. There is no mass.      Tenderness: There is no abdominal tenderness.   Musculoskeletal:         General: Swelling, tenderness and signs of injury present. No deformity.      Right lower leg: Edema present.      Comments: Picture placed in chart, cellulitis outlined:  Decrease lower extremity range of motion and pain on standing   Skin:     General: Skin is warm and dry.   Neurological:      Mental Status: He is alert and  oriented to person, place, and time.   Psychiatric:         Behavior: Behavior normal.             Significant Labs:   CBC:   No results for input(s): WBC, HGB, HCT, PLT in the last 48 hours.  CMP:   Recent Labs   Lab 11/20/20  0306 11/20/20  1104 11/21/20  0548   K  --  4.8  --    CREATININE 0.9  --  0.8   EGFRNONAA >60  --  >60       Significant Imaging:   Imaging Results          CT Leg (Tibia-Fibula) Wtih Contrast Left (Final result)  Result time 11/16/20 19:05:54    Final result by Sharmaine Ramos MD (11/16/20 19:05:54)                 Impression:      Extensive cellulitis, as above described.  Small fluid seen particularly overlying the thickened superficial fascia of the posterior lower leg.  No bony abnormality detected.      Electronically signed by: Sharmaine Ramos  Date:    11/16/2020  Time:    19:05             Narrative:    EXAMINATION:  CT LEG WITH CONTRAST LEFT    CLINICAL HISTORY:  Lower leg swelling/redness, cellulitis suspected;    TECHNIQUE:  1.25 mm unenhanced axial images were obtained through the left lower leg.  Coronal and sagittal reformatted images were provided.    COMPARISON:  None.    FINDINGS:  Extensive reticular pattern in diffuse thickening of the subcutaneous tissue are seen particularly at the lateral and posterior aspect of the lower extremity.  There is thickening of the underlying superficial fascia in the posterior lower leg.  There is a crescent of fluid seen at the posterior aspect of the middle 3rd of the leg overlying the posterior musculature.  There is a tiny suprapatellar effusion.  There is no acute fracture or dislocation.  There is no bony destruction.                                      Assessment/Plan:      * Cellulitis and abscess of left leg  11/18/20: Minimal improvement overnight, continues to complain of pain upon standing and decreased ROM - ortho following - to OR for I&D - follow cultures; change IV ABX to zosyn continue vanc - consult to  ID    11/17/2020:  Patient fever I will at the time of presentation with P temp 101.8°.  Started on dual antibiotics CT shows cellulitis that is consistent with his clinical physical assessment.  Decreased range of motion and pain on standing and to touch extremity warm with streaking.  Continue dual antibiotics awaiting surgery opinion likely home in a.m. if improves    11/16/2020:  With complaints of lower extremity swelling, and a wound that occurred while working outside from a bite. Afebrile without leukocytosis, CT with cellulitis. Sensation intact and distal pulse intact. Picture in chart  -started on rocephin/vanc  -blood cultures pending  -ortho consulted,  S/P I&D on 11.18.20,culture growing Staph aureus,will follow up with identification and sensetivity.grow MRSA,leg still warm and swollen,will continue with IV Abx.    NSVT (nonsustained ventricular tachycardia)  On Tele,resolved,cardiology is following..electrolyte is amee      Pain in left lower leg  2/2 cellulitis - he is reluctant to take pain medication due to past history of drug abuse; will order something light as he continues to complain of pain.      Sepsis  He meets criteria for sepsis based on fever to 101.8F, tachycardia to 101.8F, and soruce of infection as antibiotics  -see above  -has been started on antibiotics/fluids with cultures as above,  -    Tobacco abuse  Greater than 3 minutes spent counseling patient on dangers of continued tobacco abuse. Will provide tobacco cessation education prior to discharge.       VTE Risk Mitigation (From admission, onward)         Ordered     enoxaparin injection 40 mg  Every 24 hours      11/19/20 1030     IP VTE HIGH RISK PATIENT  Once      11/16/20 2139     Place sequential compression device  Until discontinued      11/16/20 1952     Place KELVIN hose  Until discontinued      11/16/20 1952                Discharge Planning   GURVINDER:      Code Status: Full Code   Is the patient medically ready for  discharge?:     Reason for patient still in hospital (select all that apply): Patient trending condition  Discharge Plan A: Home   Discharge Delays: None known at this time              Melanie Blanc MD  Department of Hospital Medicine   [facility]

## 2020-11-22 VITALS
HEART RATE: 70 BPM | DIASTOLIC BLOOD PRESSURE: 69 MMHG | RESPIRATION RATE: 18 BRPM | WEIGHT: 249 LBS | OXYGEN SATURATION: 100 % | BODY MASS INDEX: 34.86 KG/M2 | SYSTOLIC BLOOD PRESSURE: 112 MMHG | TEMPERATURE: 98 F | HEIGHT: 71 IN

## 2020-11-22 LAB
BACTERIA SPEC ANAEROBE CULT: NORMAL
CREAT SERPL-MCNC: 0.8 MG/DL (ref 0.5–1.4)
EST. GFR  (AFRICAN AMERICAN): >60 ML/MIN/1.73 M^2
EST. GFR  (NON AFRICAN AMERICAN): >60 ML/MIN/1.73 M^2
VANCOMYCIN TROUGH SERPL-MCNC: 16.7 UG/ML (ref 10–22)

## 2020-11-22 PROCEDURE — 82565 ASSAY OF CREATININE: CPT

## 2020-11-22 PROCEDURE — 80202 ASSAY OF VANCOMYCIN: CPT

## 2020-11-22 PROCEDURE — 63600175 PHARM REV CODE 636 W HCPCS: Performed by: STUDENT IN AN ORGANIZED HEALTH CARE EDUCATION/TRAINING PROGRAM

## 2020-11-22 PROCEDURE — 25000003 PHARM REV CODE 250: Performed by: STUDENT IN AN ORGANIZED HEALTH CARE EDUCATION/TRAINING PROGRAM

## 2020-11-22 PROCEDURE — 25000003 PHARM REV CODE 250: Performed by: HOSPITALIST

## 2020-11-22 RX ORDER — DOXYCYCLINE HYCLATE 100 MG
100 TABLET ORAL EVERY 12 HOURS
Qty: 28 TABLET | Refills: 0 | Status: SHIPPED | OUTPATIENT
Start: 2020-11-22 | End: 2020-12-06

## 2020-11-22 RX ORDER — DOXYCYCLINE HYCLATE 100 MG
100 TABLET ORAL EVERY 12 HOURS
Status: DISCONTINUED | OUTPATIENT
Start: 2020-11-22 | End: 2020-11-22 | Stop reason: HOSPADM

## 2020-11-22 RX ADMIN — VANCOMYCIN HYDROCHLORIDE 2250 MG: 10 INJECTION, POWDER, LYOPHILIZED, FOR SOLUTION INTRAVENOUS at 03:11

## 2020-11-22 RX ADMIN — DOXYCYCLINE HYCLATE 100 MG: 100 TABLET, COATED ORAL at 07:11

## 2020-11-22 NOTE — PLAN OF CARE
11/22/20 0909   Final Note   Assessment Type Final Discharge Note   Anticipated Discharge Disposition Home   What phone number can be called within the next 1-3 days to see how you are doing after discharge? [phone]   Hospital Follow Up  Appt(s) scheduled? Yes   Discharge plans and expectations educations in teach back method with documentation complete? Yes   Right Care Referral Info   Post Acute Recommendation No Care   Post-Acute Status   Discharge Delays None known at this time

## 2020-11-22 NOTE — PROGRESS NOTES
VANCOMYCIN DOSING BY PHARMACY DISCONTINUATION NOTE    Luca Ferraro is a 35 y.o. male who had been consulted for vancomycin dosing.    The pharmacy consult for vancomycin dosing has been discontinued.     Vancomycin Dosing by Pharmacy Consult will sign-off. Please reconsult if necessary. Thank you for allowing us to participate in this patient's care.       Marychuy Bradshaw  [phone]

## 2020-11-22 NOTE — PROGRESS NOTES
SW f/u with patient to deliver crutches for home use to the bedside.  SW reviewed post discharge f/u appointment.  Reviewed Education Sheet using teachback method.  Signs and symptoms which would indicate need to call nurse or return to hospital; fever, vomiting, swelling.  Patient reported that his mother will help post discharge if needed. Communication board updated with 's name and contact number.    Patient's nurse, J Carlos, notified via secure chat SW completed discharge teaching, delivered equipment to the bedside, and appts scheduled.    SW attempted contacting patient's pharmacy to obtain co-payment information; however, pharmacy was not yet open.  Opens at 10:00 a.m.  (0903H).      [id number] [address] AT [facility] [address]  [facility]  [facility] [address]  Phone: [phone] Fax: [phone]      Vita Rust LMSW, [facility]  11/22/20

## 2020-11-22 NOTE — NURSING
Pt discharged per MD order. IV dc'd, catheter tip intact. VS stable, afebrile, NAD, no nausea/vomiting. Pt educated on discharge instructions with pt verbally indicating understanding. Adjusted crutches to pt height. Pt discharged to home. Off unit via wheelchair

## 2020-11-22 NOTE — NURSING
pt awake, alert, and oriented x4. pt   able to make needs known. cardiac monitor in place. ambulatory. independent of adl's. iv fluid   infusing. ABX adm as ordered. hourly rounds   made. will continue to monitor.

## 2020-11-22 NOTE — PROGRESS NOTES
SHADI [facility] Palma [facility] regarding order for crutches for home use.  OK to pull from DME closet.    Vita Rust LMSW, CCM  11/22/20

## 2020-11-22 NOTE — DISCHARGE SUMMARY
[facility] Hospital Medicine  Discharge Summary      Patient Name: Luca Ferraro  MRN: [id number]  Admission Date: 11/16/2020  Hospital Length of Stay: 4 days  Discharge Date and Time:  11/22/2020 8:58 AM  Attending Physician: Melanie Blanc MD   Discharging Provider: Melanie Blanc MD  Primary Care Provider: Primary Doctor No      HPI:   Luca Ferraro 35 y.o. with tobacco abuse presents to the hospital with a chief complaint of lower extremity swelling. He reports since Friday he has had increasing lower extremity left swelling associated with an intermittent throbbing pain worsened with ambulation without alleviating factors. He has attempted no treatment at home. He has had increasing difficulty ambulating. He believes he was bitten by something that caused the initial swelling. He has been able to express pus and blood from the wound. He has had intermittent fevers at home. He denies fever, chest pain, SOB, N/V, abdominal pain, dizziness, dysuria, syncope. He smokes 1/2ppd he does not drink or use recreational drugs.     In the ED, CT with extensive cellulitis with small fluid seen overlying the thickened superifical fascia of the posterior leg, febrile to 101.8F and WBC of 12.03.     Procedure(s) (LRB):  DEBRIDEMENT, LOWER EXTREMITY-  LOWER LEG (Left)      Hospital Course:   35-year-old  male presented for evaluation of left lower extremity swelling around the area of the catheterization.  Clinical assessment finds an extremely swollen calve much larger in comparison with the other calve.  There is an area that appears to be a puncture wound from some formed insect/crit her that may a bit in the patient with swelling and streaking down toward the ankle.  The area is warm and painful to touch.  There is decreased range of motion in lower extremity and pain upon standing.  Patient was started on dual IV antibiotics overnight and surgery consulted.  CT lower  lid with contrast reveals an extensive cellulitis with small fluid seen overlying the thickening superficial fascia of the posterior lower leg.  Also noted on CT is a suprapatellar effusion.Orthopedic was consulted for I&D 2/2 to minimal improvement with dual ABX  Continued dual antibiotics, change Rocephin to zosyn continue Vanc; Await cultures -- temp overnight peak 100.7 with LLE swelling showing minimal improvement - continues to complain of pain on standing and decreased ROM - streaking from calve down back of leg - pain support.  S/P I&D on 11.18.20,culture grow MRSA,,leg still warm and swollen, continued with IV Abx.ID was following.  Had NSVT,resolved,cardiology was  following.electrolyte is normal.aurelia follow up as out patient.  Her cellulitis,abscess much improved,wound care was done by orthopedic,patient dat discharged home with PO Abx and follow up with PCP,cardiology and orthopedic as out patient.     Consults:   Consults (From admission, onward)        Status Ordering Provider     Inpatient consult to Cardiology  Once     Provider:  Jaswinder Leo MD    Completed ANALIA BUTT     Inpatient consult to Infectious Diseases  Once     Provider:  Sophia Hewitt MD    Completed RAZ HIGGINS     Inpatient consult to Orthopedic Surgery  Once     Provider:  Wiliam Abernathy MD    Completed RUFINA COON     Inpatient consult to Social Work  Once     Provider:  (Not yet assigned)    Completed JASWINDER LEO     Inpatient consult to Social Work  Once     Provider:  (Not yet assigned)    Completed ANALIA BUTT          No new Assessment & Plan notes have been filed under this hospital service since the last note was generated.  Service: Hospital Medicine    Final Active Diagnoses:    Diagnosis Date Noted POA    PRINCIPAL PROBLEM:  Cellulitis and abscess of left leg [L03.116, L02.416] 11/16/2020 Yes    NSVT (nonsustained ventricular tachycardia) [I47.2] 11/19/2020 Yes    Pain in  "left lower leg [M79.662] 11/18/2020 Yes    Tobacco abuse [Z72.0] 11/16/2020 Yes    Sepsis [A41.9] 11/16/2020 Yes      Problems Resolved During this Admission:       Discharged Condition: stable    Disposition: Home or Self Care    Follow Up:  Follow-up Information     [facility] [place].    Why: call at time of discharge to schedule post hospital discharge with PCP as needed  Contact information:  [address]  [place] [facility]  [phone]             Jaswinder Orozco MD On 12/7/2020.    Specialties: Cardiology, INTERVENTIONAL CARDIOLOGY  Why: Hospital discharge follow up, appointment scheduled for 0900 at the [facility]  Contact information:  [address]  [address]  [place] [address]  [phone]             Juliocesar Roberson MD On 11/25/2020.    Specialty: Orthopedic Surgery  Why: Hospital discharge follow up, appointment scheduled for 09:30  Contact information:  [address]  [address]  [place] [address]  [phone]                 Patient Instructions:      CRUTCHES FOR HOME USE     Order Specific Question Answer Comments   Type: Axillary    Height: 5' 11" (1.803 m)    Weight: 112.9 kg (249 lb)    Does patient have medical equipment at home? none    Length of need (1-99 months): 99      Activity as tolerated       Significant Diagnostic Studies: Labs:   BMP:   Recent Labs   Lab 11/20/20  1104 11/21/20  0548 11/22/20  0245   K 4.8  --   --    CREATININE  --  0.8 0.8   MG 2.2  --   --    , CMP   Recent Labs   Lab 11/20/20  1104 11/21/20  0548 11/22/20  0245   K 4.8  --   --    CREATININE  --  0.8 0.8   ESTGFRAFRICA  --  >60 >60   EGFRNONAA  --  >60 >60    and CBC No results for input(s): WBC, HGB, HCT, PLT in the last 48 hours.  Radiology: X-Ray: CXR: X-Ray Chest 1 View (CXR): No results found for this visit on 11/16/20. and X-Ray Chest PA and Lateral (CXR): No results found for this visit on 11/16/20.  CT leg ,    Pending Diagnostic Studies:     None         Medications:  Reconciled " Home Medications:      Medication List      START taking these medications    doxycycline 100 MG tablet  Commonly known as: VIBRA-TABS  Take 1 tablet (100 mg total) by mouth every 12 (twelve) hours. for 14 days        STOP taking these medications    oxyCODONE 10 mg 12 hr tablet  Commonly known as: OXYCONTIN            Indwelling Lines/Drains at time of discharge:   Lines/Drains/Airways     None                 Time spent on the discharge of patient: over 30  minutes  Patient was seen and examined on the date of discharge and determined to be suitable for discharge.         Melanie Blanc MD  Department of Hospital Medicine  [facility]

## 2020-11-22 NOTE — CONSULTS
Nov25 Follow up with Juliocesar Roberson MD  Wednesday Nov 25, 2020  Hospital discharge follow up, appointment scheduled for 09:30 [address]   [address]   [place] [address]  [phone]

## 2020-11-22 NOTE — PLAN OF CARE
Problem: Adult Inpatient Plan of Care  Goal: Plan of Care Review  Outcome: Ongoing, Progressing  Goal: Absence of Hospital-Acquired Illness or Injury  Outcome: Ongoing, Progressing  Intervention: Identify and Manage Fall Risk  Flowsheets (Taken 11/22/2020 0220)  Safety Promotion/Fall Prevention:   assistive device/personal item within reach   nonskid shoes/socks when out of bed   side rails raised x 2  Goal: Rounds/Family Conference  Outcome: Ongoing, Progressing     Problem: Glycemic Control Impaired (Sepsis/Septic Shock)  Goal: Blood Glucose Level Within Desired Range  Outcome: Ongoing, Progressing  Intervention: Optimize Glycemic Control  Flowsheets (Taken 11/22/2020 0220)  Glycemic Management: blood glucose monitoring     Problem: Fall Injury Risk  Goal: Absence of Fall and Fall-Related Injury  Outcome: Ongoing, Progressing  Intervention: Identify and Manage Contributors to Fall Injury Risk  Flowsheets (Taken 11/22/2020 0220)  Self-Care Promotion: independence encouraged  Medication Review/Management: medications reviewed  Intervention: Promote Injury-Free Environment  Flowsheets (Taken 11/22/2020 0220)  Safety Promotion/Fall Prevention:   assistive device/personal item within reach   nonskid shoes/socks when out of bed   side rails raised x 2  Environmental Safety Modification:   assistive device/personal items within reach   clutter free environment maintained

## 2020-11-22 NOTE — PROGRESS NOTES
WRITTEN HEALTHCARE DISCHARGE INFORMATION     Follow-up Information     [facility] [place].    Why: call at time of discharge to schedule post hospital discharge with PCP as needed  Contact information:  [address] [place] [address]  [phone]             Jaswinder Orozco MD On 12/7/2020.    Specialties: Cardiology, INTERVENTIONAL CARDIOLOGY  Why: Hospital discharge follow up, appointment [facility] for 0900 at [facility]  Contact information:  [address] [facility]  [address]  [place] [address]  [phone]             Juliocesar Roberson MD On 11/25/2020.    Specialty: Orthopedic Surgery  Why: Hospital discharge follow up, appointment scheduled for 09:30  Contact information:  [address] [facility]  [address]  [place] [address]  [phone]                   If you are unable to make your follow-up appointments, please call the number listed and reschedule the appointment(s).     After discharge, if you need assistance, you can call [facility] for 24/7 assistance at [phone].        Within 48-72 hours after leaving the hospital you will receive a call from [facility] nurses following up to see how you are doing. The team will ask you a few questions and the call will last approximately 20 minutes. Please answer any calls you may receive from [facility].     We want to continue to support you as you manage your healthcare needs.    1)  Keep all scheduled appointments;    2)  Get your prescriptions filled; and    3)  Take your medication as prescribed.     [facility] is happy to have the opportunity to serve you.    Thank You,    Vita-  [phone]

## 2020-11-22 NOTE — PROGRESS NOTES
Pharmacokinetic Assessment Follow Up: IV Vancomycin    Vancomycin serum concentration assessment(s):    The trough level was drawn correctly and can be used to guide therapy at this time. The measurement is within the desired definitive target range of 10 to 20 mcg/mL.    Vancomycin Regimen Plan:    Continue regimen to Vancomycin 2250 mg IV every 12 hours with next serum trough concentration measured at 03:00 prior to fourth dose on 11/24/20    Drug levels (last 3 results):  Recent Labs   Lab Result Units 11/20/20  0307 11/22/20  0245   Vancomycin-Trough ug/mL 13.4 16.7       Pharmacy will continue to follow and monitor vancomycin.    Please contact pharmacy at extension [phone] for questions regarding this assessment.    Thank you for the consult,   Raysa Sampson       Patient brief summary:  Luca Ferraro is a 35 y.o. male initiated on antimicrobial therapy with IV Vancomycin for treatment of skin & soft tissue infection    The patient's current regimen is Vancomycin 2250 mg IV q12h    Drug Allergies:   Review of patient's allergies indicates:  No Known Allergies    Actual Body Weight:   112.9 kg    Renal Function:   Estimated Creatinine Clearance: 166.2 mL/min (based on SCr of 0.8 mg/dL).,     Dialysis Method (if applicable):  N/A    CBC (last 72 hours):  No results for input(s): WHITE BLOOD CELL COUNT, HEMOGLOBIN, HEMATOCRIT, PLATELETS, GRAN%, LYMPH%, MONO%, EOSINOPHIL%, BASOPHIL%, DIFFERENTIAL METHOD in the last 72 hours.    Metabolic Panel (last 72 hours):  Recent Labs   Lab Result Units 11/19/20  0544 11/20/20  0306 11/20/20  1104 11/21/20  0548 11/22/20  0245   Potassium mmol/L  --   --  4.8  --   --    Creatinine mg/dL 1.2 0.9  --  0.8 0.8   Magnesium mg/dL  --   --  2.2  --   --        Vancomycin Administrations:  vancomycin given in the last 96 hours                     vancomycin (VANCOCIN) 2,250 mg in dextrose 5 % 500 mL IVPB (mg) 2,250 mg New Bag 11/22/20 0332     2,250 mg New Bag 11/21/20 2922      2,250 mg New Bag  0312     2,250 mg New Bag 11/20/20 1502     2,250 mg New Bag  0350     2,250 mg New Bag 11/19/20 1655     2,250 mg New Bag  0440     2,250 mg New Bag 11/18/20 1518                    Microbiologic Results:  Microbiology Results (last 7 days)       Procedure Component Value Units Date/Time    Blood culture #2 **CANNOT BE ORDERED STAT** [[id number]] Collected: 11/16/20 1606    Order Status: Completed Specimen: Blood from Peripheral, Antecubital, Left Updated: 11/21/20 1703     Blood Culture, Routine No growth after 5 days.    Blood culture #1 **CANNOT BE ORDERED STAT** [[id number]] Collected: 11/16/20 1612    Order Status: Completed Specimen: Blood from Peripheral, Antecubital, Right Updated: 11/21/20 1703     Blood Culture, Routine No growth after 5 days.    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

## 2020-11-24 NOTE — ANESTHESIA POSTPROCEDURE EVALUATION
Anesthesia Post Evaluation    Patient: Luca Ferraro    Procedure(s) Performed: Procedure(s) (LRB):  DEBRIDEMENT, LOWER EXTREMITY-  LOWER LEG (Left)    Final Anesthesia Type: general    Patient location during evaluation: PACU  Patient participation: Yes- Able to Participate  Level of consciousness: awake and alert  Post-procedure vital signs: reviewed and stable  Pain management: adequate  Airway patency: patent    PONV status at discharge: No PONV  Anesthetic complications: no      Cardiovascular status: blood pressure returned to baseline and hemodynamically stable  Respiratory status: unassisted and spontaneous ventilation  Hydration status: euvolemic  Follow-up not needed.          Vitals Value Taken Time   /69 11/22/20 0842   Temp 36.6 °C (97.8 °F) 11/22/20 0842   Pulse 70 11/22/20 0842   Resp 18 11/22/20 0842   SpO2 100 % 11/22/20 0842         Event Time   Out of Recovery 11/18/2020 13:48:00         Pain/Janey Score: No data recorded

## 2020-11-24 NOTE — PHYSICIAN QUERY
"PT Name: Luca Ferraro  MR #: [id number]    Procedure Clarification     CDS/: Marlene Garcia               Contact information:[email or web address]  This form is a permanent document in the medical record.    Query Date: November 24, 2020  By submitting this query, we are merely seeking further clarification of documentation. Please utilize your independent clinical judgment when addressing the question(s) below.    The Medical Record contains the following:   Indicator Supporting Clinical Findings Location in Medical Record    x Documentation of "Debridement"  Procedure: Procedure(s) (LRB):  DEBRIDEMENT, LOWER EXTREMITY-  LOWER LEG (Left)     Orthopedic Surgery Op Note 11/18/2020    x Documentation of "I&D"  Technical Procedures Used: I&D  Orthopedic Surgery Op Note 11/18/2020      x Other  Friable tissue was then debrided with the rongeur and with a curette.  There was some necrotic fat but the fascia appeared to be intact and was not violated.   Orthopedic Surgery Op Note 11/18/2020     Excisional debridement is the surgical removal or cutting away of such tissue, necrosis, or slough and is classified to the root operation "Excision." Use of a sharp instrument does not always indicate that an excisional debridement was performed. Minor removal of loose fragments with scissors or using a sharp instrument to scrape away tissue is not an excisional debridement. Excisional debridement involves the use of a scalpel to remove devitalized tissue.    Nonexcisional debridement is the nonoperative brushing, irrigating, scrubbing, or washing of devitalized tissue, necrosis, slough, or foreign material. Most nonexcisional debridement procedures are classified to the root operation "Extraction" (pulling or stripping out or off all or a portion of a body part by the use of force).     Provider, please provide further clarification on the procedure performed on __Left lower extremity             :    [   ] Excisional " Debridement of skin   [   ] Excisional Debridement of subcutaneous tissue/fascia          [   ] Incision and Drainage to depth of skin only   [   ] Incision and Drainage to depth of subcutaneous and fascia   [   ] Incision and Drainage to other depth (please specify): _____________     [ x  ] Other Procedure (please specify): ___please ask ,id di not do debridement__________   [  ] Clinically Undetermined     Reference:    ICD-10-CM/PCS Coding Clinic Third Quarter ICD-10, Effective with discharges: October 7, 2015 [facility] § Excisional and nonexcisional debridement (2015).    Form No. 58901

## 2020-11-24 NOTE — PHYSICIAN QUERY
PT Name: Luca Ferraro  MR #: [id number]    CAUSE AND EFFECT RELATIONSHIP CLARIFICATION     CDS/: Marlene Garcia               Contact information: [email or web address]    This form is a permanent document in the medical record.     Query Date: November 24, 2020    By submitting this query, we are merely seeking further clarification of documentation. Please utilize your independent clinical judgment when addressing the question(s) below.    Supporting Clinical Findings Location in Medical Record     Sepsis  He meets criteria for sepsis based on fever to 101.8F, tachycardia to 101.8F, and soruce of infection as antibiotics  -see above  -has been started on antibiotics/fluids with cultures as above     Hospital Medicine Progress Notes 11/21/2020     S/P I&D on 11.18.20,culture growing Staph aureus,will follow up with identification and sensetivity.grow MRSA,leg still warm and swollen,will continue with IV Abx   Hospital Medicine Progress Notes 11/21/2020       Provider, please clarify if there is any clinical correlation between ____Sepsis__ and __MRSA________________.           Are the conditions:      [  X] Due to or associated with each other   [  ] Unrelated to each other   [  ] Other explanation (Please Specify): ______________   [  ] Clinically Undetermined                                                                               Please document in your progress notes daily for the duration of treatment until resolved and include in your discharge summary.

## 2020-12-01 NOTE — PHYSICIAN QUERY
"PT Name: Luca Ferraro  MR #: [id number]    Procedure Clarification     CDS/: Marlene Garcia               Contact information:[email or web address]  This form is a permanent document in the medical record.    Query Date: December 1, 2020  By submitting this query, we are merely seeking further clarification of documentation. Please utilize your independent clinical judgment when addressing the question(s) below.    The Medical Record contains the following:   Indicator Supporting Clinical Findings Location in Medical Record    x Documentation of "Debridement"  Procedure: Procedure(s) (LRB):  DEBRIDEMENT, LOWER EXTREMITY-  LOWER LEG (Left)     Orthopedic Surgery Op Note 11/18/2020    x Documentation of "I&D"  Technical Procedures Used: I&D  Orthopedic Surgery Op Note 11/18/2020      x Other  Friable tissue was then debrided with the rongeur and with a curette.  There was some necrotic fat but the fascia appeared to be intact and was not violated.   Orthopedic Surgery Op Note 11/18/2020     Excisional debridement is the surgical removal or cutting away of such tissue, necrosis, or slough and is classified to the root operation "Excision." Use of a sharp instrument does not always indicate that an excisional debridement was performed. Minor removal of loose fragments with scissors or using a sharp instrument to scrape away tissue is not an excisional debridement. Excisional debridement involves the use of a scalpel to remove devitalized tissue.    Nonexcisional debridement is the nonoperative brushing, irrigating, scrubbing, or washing of devitalized tissue, necrosis, slough, or foreign material. Most nonexcisional debridement procedures are classified to the root operation "Extraction" (pulling or stripping out or off all or a portion of a body part by the use of force).     Provider, please provide further clarification on the procedure performed on __Left lower extremity             :    [   ] Excisional " Debridement of skin   [   ] Excisional Debridement of subcutaneous tissue/fascia          [   ] Incision and Drainage to depth of skin only   [ X  ] Incision and Drainage to depth of subcutaneous and fascia   [   ] Incision and Drainage to other depth (please specify): _____________     [   ] Other Procedure (please specify): ______________________________   [  ] Clinically Undetermined     Reference:    ICD-10-CM/PCS Coding Clinic Third Quarter ICD-10, Effective with discharges: October 7, 2015 [facility] § Excisional and nonexcisional debridement (2015).    Form No. [id number]

## 2021-07-21 ENCOUNTER — HOSPITAL ENCOUNTER (EMERGENCY)
Facility: HOSPITAL | Age: 36
Discharge: HOME | End: 2021-07-21
Attending: EMERGENCY MEDICINE
Payer: MEDICAID

## 2021-07-21 VITALS
WEIGHT: 240 LBS | DIASTOLIC BLOOD PRESSURE: 78 MMHG | BODY MASS INDEX: 33.6 KG/M2 | OXYGEN SATURATION: 98 % | TEMPERATURE: 103 F | RESPIRATION RATE: 18 BRPM | HEART RATE: 105 BPM | SYSTOLIC BLOOD PRESSURE: 133 MMHG | HEIGHT: 71 IN

## 2021-07-21 DIAGNOSIS — U07.1 COVID-19: Primary | ICD-10-CM

## 2021-07-21 DIAGNOSIS — U07.1 COVID-19 VIRUS DETECTED: ICD-10-CM

## 2021-07-21 LAB
CTP QC/QA: YES
SARS-COV-2 RDRP RESP QL NAA+PROBE: POSITIVE

## 2021-07-21 PROCEDURE — 99284 EMERGENCY DEPT VISIT MOD MDM: CPT

## 2021-07-21 PROCEDURE — U0002 COVID-19 LAB TEST NON-CDC: HCPCS | Performed by: EMERGENCY MEDICINE

## 2021-07-21 RX ORDER — PROMETHAZINE HYDROCHLORIDE AND DEXTROMETHORPHAN HYDROBROMIDE 6.25; 15 MG/5ML; MG/5ML
5 SYRUP ORAL EVERY 4 HOURS PRN
Qty: 180 ML | Refills: 0 | Status: SHIPPED | OUTPATIENT
Start: 2021-07-21 | End: 2021-07-31

## 2021-07-21 RX ORDER — ACETAMINOPHEN 500 MG
1000 TABLET ORAL
Status: DISCONTINUED | OUTPATIENT
Start: 2021-07-21 | End: 2021-07-21 | Stop reason: HOSPADM

## 2021-07-21 RX ORDER — IBUPROFEN 600 MG/1
600 TABLET ORAL EVERY 6 HOURS PRN
Qty: 20 TABLET | Refills: 0 | Status: SHIPPED | OUTPATIENT
Start: 2021-07-21

## 2024-01-18 NOTE — SUBJECTIVE & OBJECTIVE
Interval history: NAEO. Packing removed. Wound cultures growing MRSA. bcx still clear. Reports improving pain in leg. Denies other complaints.     History reviewed. No pertinent past medical history.      Past Surgical History:   Procedure Laterality Date    DEBRIDEMENT OF LOWER EXTREMITY Left 11/18/2020    Procedure: DEBRIDEMENT, LOWER EXTREMITY-  LOWER LEG;  Surgeon: Juliocesar Roberson MD;  Location: [facility];  Service: Orthopedics;  Laterality: Left;       Review of patient's allergies indicates:  No Known Allergies    No current facility-administered medications on file prior to encounter.      Current Outpatient Medications on File Prior to Encounter   Medication Sig    oxyCODONE (OXYCONTIN) 10 mg 12 hr tablet Take 10 mg by mouth every 12 (twelve) hours as needed for Pain.     Family History     None        Tobacco Use    Smoking status: Current Every Day Smoker     Packs/day: 1.00     Types: Cigarettes    Smokeless tobacco: Never Used   Substance and Sexual Activity    Alcohol use: Yes    Drug use: Not Currently     Types: Marijuana    Sexual activity: Yes     Review of Systems   Constitutional: Positive for fever. Negative for chills.   HENT: Negative for nosebleeds and tinnitus.    Eyes: Negative for photophobia and visual disturbance.   Respiratory: Negative for shortness of breath and wheezing.    Cardiovascular: Negative for chest pain, palpitations and leg swelling.   Gastrointestinal: Negative for abdominal distention, nausea and vomiting.   Genitourinary: Negative for dysuria, flank pain and hematuria.   Musculoskeletal: Negative for gait problem and joint swelling.   Skin: Positive for color change, rash and wound.   Neurological: Negative for seizures and syncope.     Objective:     Vital Signs (Most Recent):  Temp: 98.2 °F (36.8 °C) (11/20/20 1124)  Pulse: 74 (11/20/20 1124)  Resp: 17 (11/20/20 1124)  BP: 110/65 (11/20/20 1124)  SpO2: 99 % (11/20/20 1124) Vital Signs (24h Range):  Temp:  [98.1  °F (36.7 °C)-99.6 °F (37.6 °C)] 98.2 °F (36.8 °C)  Pulse:  [71-91] 74  Resp:  [16-18] 17  SpO2:  [98 %-99 %] 99 %  BP: (109-143)/(55-85) 110/65     Weight: 112.9 kg (249 lb)  Body mass index is 34.73 kg/m².    Physical Exam  Vitals signs and nursing note reviewed.   Constitutional:       General: He is not in acute distress.     Appearance: He is well-developed.   HENT:      Head: Normocephalic and atraumatic.   Eyes:      General:         Right eye: No discharge.         Left eye: No discharge.      Conjunctiva/sclera: Conjunctivae normal.   Neck:      Musculoskeletal: Normal range of motion.      Thyroid: No thyromegaly.   Cardiovascular:      Rate and Rhythm: Normal rate and regular rhythm.      Heart sounds: No murmur.   Pulmonary:      Effort: Pulmonary effort is normal. No respiratory distress.      Breath sounds: Normal breath sounds.   Abdominal:      General: Bowel sounds are normal. There is no distension.      Palpations: Abdomen is soft. There is no mass.      Tenderness: There is no abdominal tenderness.   Musculoskeletal:         General: Swelling, tenderness and signs of injury present. No deformity.      Right lower leg: Edema present.      Comments: Post op dressings c/d/i. See photo   Skin:     General: Skin is warm and dry.   Neurological:      Mental Status: He is alert and oriented to person, place, and time.   Psychiatric:         Behavior: Behavior normal.                 Significant Labs:   CBC:   No results for input(s): WBC, HGB, HCT, PLT in the last 48 hours.  CMP:   Recent Labs   Lab 11/19/20  0544 11/20/20  0306 11/20/20  1104   K  --   --  4.8   CREATININE 1.2 0.9  --    EGFRNONAA >60 >60  --        Significant Imaging:   Imaging Results          CT Leg (Tibia-Fibula) Wtih Contrast Left (Final result)  Result time 11/16/20 19:05:54    Final result by Sharmaine Ramos MD (11/16/20 19:05:54)                 Impression:      Extensive cellulitis, as above described.  Small fluid seen  particularly overlying the thickened superficial fascia of the posterior lower leg.  No bony abnormality detected.      Electronically signed by: Sharmaine Ramos  Date:    11/16/2020  Time:    19:05             Narrative:    EXAMINATION:  CT LEG WITH CONTRAST LEFT    CLINICAL HISTORY:  Lower leg swelling/redness, cellulitis suspected;    TECHNIQUE:  1.25 mm unenhanced axial images were obtained through the left lower leg.  Coronal and sagittal reformatted images were provided.    COMPARISON:  None.    FINDINGS:  Extensive reticular pattern in diffuse thickening of the subcutaneous tissue are seen particularly at the lateral and posterior aspect of the lower extremity.  There is thickening of the underlying superficial fascia in the posterior lower leg.  There is a crescent of fluid seen at the posterior aspect of the middle 3rd of the leg overlying the posterior musculature.  There is a tiny suprapatellar effusion.  There is no acute fracture or dislocation.  There is no bony destruction.                                   ---

## (undated) DEVICE — GLOVE SURGICAL LATEX SZ 6.5

## (undated) DEVICE — SUT ETHILON 4-0 PS2 18 BLK

## (undated) DEVICE — UNDERGLOVE BIOGEL PI SZ 6.5 LF

## (undated) DEVICE — SUT 3/0 27IN COATED VICRYL

## (undated) DEVICE — CANISTER SUCTION 2 LTR

## (undated) DEVICE — GLOVE BIOGEL ORTHOPEDIC 8

## (undated) DEVICE — PACK ARTHROSCOPY W/ISO BAC

## (undated) DEVICE — BLANKET UPPER BODY 78.7X29.9IN

## (undated) DEVICE — PAD PREP 50/CA

## (undated) DEVICE — PAD CAST SPECIALIST STRL 6

## (undated) DEVICE — SOL 0.9% NACL IRRI.IN STERIL

## (undated) DEVICE — Device

## (undated) DEVICE — GAUZE PACKING STRIP PLAIN 1X5

## (undated) DEVICE — COVER OVERHEAD SURG LT BLUE

## (undated) DEVICE — GLOVE SURGICAL LATEX SZ 8

## (undated) DEVICE — SEE MEDLINE ITEM 146231

## (undated) DEVICE — ELECTRODE REM PLYHSV RETURN 9

## (undated) DEVICE — SEE MEDLINE ITEM 157110

## (undated) DEVICE — PAD ABD 8X10 STERILE

## (undated) DEVICE — APPLICATOR CHLORAPREP ORN 26ML